# Patient Record
Sex: FEMALE | Race: WHITE | NOT HISPANIC OR LATINO | Employment: OTHER | URBAN - METROPOLITAN AREA
[De-identification: names, ages, dates, MRNs, and addresses within clinical notes are randomized per-mention and may not be internally consistent; named-entity substitution may affect disease eponyms.]

---

## 2018-09-06 ENCOUNTER — TELEPHONE (OUTPATIENT)
Dept: RHEUMATOLOGY | Facility: CLINIC | Age: 73
End: 2018-09-06

## 2018-09-06 NOTE — TELEPHONE ENCOUNTER
----- Message from Elroy Hopkins sent at 9/6/2018 12:18 PM CDT -----  Type:  Sooner Apoointment Request    Caller is requesting a sooner appointment.  Caller declined first available appointment listed below.  Caller will not accept being placed on the waitlist and is requesting a message be sent to doctor.    Name of Caller:  Patient  When is the first available appointment: na  Best Call Back Number:  [phone]  Additional Information:  New Patient has possible Rheumatoid Arthritis     Spoke to the patient and booked for next March. Will get a referral from PCP and link to appointment. Letter mailed and patient is on the wait list. IRIS

## 2018-11-13 PROBLEM — M16.12 OSTEOARTHRITIS OF LEFT HIP: Status: ACTIVE | Noted: 2018-11-13

## 2019-03-20 ENCOUNTER — HOSPITAL ENCOUNTER (OUTPATIENT)
Dept: RADIOLOGY | Facility: HOSPITAL | Age: 74
Discharge: HOME | End: 2019-03-20
Attending: INTERNAL MEDICINE
Payer: MEDICARE

## 2019-03-20 ENCOUNTER — INITIAL CONSULT (OUTPATIENT)
Dept: RHEUMATOLOGY | Facility: CLINIC | Age: 74
End: 2019-03-20
Payer: MEDICARE

## 2019-03-20 VITALS
HEART RATE: 67 BPM | WEIGHT: 180 LBS | BODY MASS INDEX: 33.13 KG/M2 | DIASTOLIC BLOOD PRESSURE: 83 MMHG | SYSTOLIC BLOOD PRESSURE: 138 MMHG | HEIGHT: 62 IN

## 2019-03-20 DIAGNOSIS — M15.4 EROSIVE OSTEOARTHRITIS OF BOTH HANDS: ICD-10-CM

## 2019-03-20 DIAGNOSIS — M15.4 EROSIVE OSTEOARTHRITIS OF BOTH HANDS: Primary | ICD-10-CM

## 2019-03-20 PROCEDURE — 99999 PR PBB SHADOW E&M-EST. PATIENT-LVL III: CPT | Mod: PBBFAC,,, | Performed by: INTERNAL MEDICINE

## 2019-03-20 PROCEDURE — 3079F DIAST BP 80-89 MM HG: CPT | Mod: CPTII,S$GLB,, | Performed by: INTERNAL MEDICINE

## 2019-03-20 PROCEDURE — 73130 XR HAND COMPLETE 3 VIEWS BILATERAL: ICD-10-PCS | Mod: 26,50,, | Performed by: RADIOLOGY

## 2019-03-20 PROCEDURE — 73130 X-RAY EXAM OF HAND: CPT | Mod: 50,TC,FY,PO

## 2019-03-20 PROCEDURE — 1101F PT FALLS ASSESS-DOCD LE1/YR: CPT | Mod: CPTII,S$GLB,, | Performed by: INTERNAL MEDICINE

## 2019-03-20 PROCEDURE — 73130 X-RAY EXAM OF HAND: CPT | Mod: 26,50,, | Performed by: RADIOLOGY

## 2019-03-20 PROCEDURE — 3075F SYST BP GE 130 - 139MM HG: CPT | Mod: CPTII,S$GLB,, | Performed by: INTERNAL MEDICINE

## 2019-03-20 PROCEDURE — 99205 PR OFFICE/OUTPT VISIT, NEW, LEVL V, 60-74 MIN: ICD-10-PCS | Mod: S$GLB,,, | Performed by: INTERNAL MEDICINE

## 2019-03-20 PROCEDURE — 99205 OFFICE O/P NEW HI 60 MIN: CPT | Mod: S$GLB,,, | Performed by: INTERNAL MEDICINE

## 2019-03-20 PROCEDURE — 1101F PR PT FALLS ASSESS DOC 0-1 FALLS W/OUT INJ PAST YR: ICD-10-PCS | Mod: CPTII,S$GLB,, | Performed by: INTERNAL MEDICINE

## 2019-03-20 PROCEDURE — 3079F PR MOST RECENT DIASTOLIC BLOOD PRESSURE 80-89 MM HG: ICD-10-PCS | Mod: CPTII,S$GLB,, | Performed by: INTERNAL MEDICINE

## 2019-03-20 PROCEDURE — 3075F PR MOST RECENT SYSTOLIC BLOOD PRESS GE 130-139MM HG: ICD-10-PCS | Mod: CPTII,S$GLB,, | Performed by: INTERNAL MEDICINE

## 2019-03-20 PROCEDURE — 99999 PR PBB SHADOW E&M-EST. PATIENT-LVL III: ICD-10-PCS | Mod: PBBFAC,,, | Performed by: INTERNAL MEDICINE

## 2019-03-20 NOTE — PATIENT INSTRUCTIONS
1. Omega 3 fatty acids: fish oils double the normal dose.     2. Steven Naturally Curamin or CuraMed     3. Triple products with glucosamine/Chondroitin sulfate, tumeric/curcumin, and fish oils.

## 2019-03-20 NOTE — PROGRESS NOTES
"Subjective:          Chief Complaint: uJlia Ward is a 74 y.o. female who had no chief complaint listed for this encounter.    HPI:    Patient is a 74-year-old female referred for from her primary care physician regarding long history of osteoarthritis she was referred with c/o hands arthritis , but also in 9/2018 has acute onset left hip pain ultimately had BETZAIDA.   Due to pain.   She does use PRN Ibuprofen 600-800mg only as needed, no hx of MI/CAD but limits to watch kidney function.   Tylenol no real help.         Left knee and bilateral hip arthroplasty most recent being the left hip 2018.  Patient states that her left hand does hurt at night describes throbbing and burtning. Trouble with fine motor/. First noticed bony hypertrophy in 50s.   Notes when inflammed become red and swollen then new "bump" with arrive.   Right knee is painful.   No problems with feet. Some neck pain with restriction ROM.     + family hx of OA, no known RA, father with hands like mine.       REVIEW OF SYSTEMS:    ROS            Objective:            Past Medical History:   Diagnosis Date    Anxiety     Encounter for blood transfusion     GERD (gastroesophageal reflux disease)     Hypertension      Family History   Problem Relation Age of Onset    Alzheimer's disease Mother     Cancer Maternal Aunt         breast    Alzheimer's disease Maternal Aunt     Breast cancer Maternal Aunt     Heart disease Neg Hx     Diabetes Neg Hx      Social History     Tobacco Use    Smoking status: Never Smoker    Smokeless tobacco: Never Used   Substance Use Topics    Alcohol use: No    Drug use: No         Current Outpatient Medications on File Prior to Visit   Medication Sig Dispense Refill    amLODIPine (NORVASC) 2.5 MG tablet Take 1 tablet (2.5 mg total) by mouth once daily. 90 tablet 3    ascorbic acid (VITAMIN C) 500 MG tablet Take 500 mg by mouth once daily.      b complex vitamins tablet Take 1 tablet by mouth once daily.   "    calcium carbonate (TUMS) 200 mg calcium (500 mg) chewable tablet Take 2 tablets by mouth as needed.       citalopram (CELEXA) 20 MG tablet Take 1 tablet (20 mg total) by mouth once daily. 90 tablet 3    co-enzyme Q-10 30 mg capsule Take 100 mg by mouth once daily.      cyanocobalamin (VITAMIN B-12) 1000 MCG tablet Take 100 mcg by mouth once daily.      folic acid (FOLVITE) 400 MCG tablet Take 400 mcg by mouth once daily.      metoprolol succinate (TOPROL-XL) 100 MG 24 hr tablet Take 1 tablet (100 mg total) by mouth once daily. 90 tablet 3    multivitamin (ONE DAILY MULTIVITAMIN) per tablet Take 1 tablet by mouth once daily.      vitamin D 1000 units Tab Take 1,000 mg by mouth.       [DISCONTINUED] estradiol 0.05 mg/24 hr td ptwk 0.05 mg/24 hr PTWK        No current facility-administered medications on file prior to visit.        Vitals:    03/20/19 0911   BP: 138/83   Pulse: 67       Physical Exam:    Physical Exam          Assessment:       Encounter Diagnosis   Name Primary?    Erosive osteoarthritis of both hands Yes          Plan:        Erosive osteoarthritis of both hands  -     Sedimentation rate; Future; Expected date: 03/20/2019  -     C-reactive protein; Standing; Expected date: 03/20/2019  -     Rheumatoid factor; Future; Expected date: 03/20/2019  -     Cyclic citrul peptide antibody, IgG; Future; Expected date: 03/20/2019  -     X-Ray Hand 3 View Bilateral; Future; Expected date: 03/20/2019      Delightful 75 y/o female.    Trial with OTC complementary medicine.     Follow-up in about 4 months (around 7/20/2019).

## 2019-03-20 NOTE — LETTER
March 20, 2019      Isha Phillips MD  [address] [place] [address] [address] [place] [address]           [facility]  [address] [facility] [address] [place] [address]  Phone: [phone]  Fax: [phone]          Patient: Julia Ward   MR Number: [id number]   YOB: 1945   Date of Visit: 3/20/2019       Dear Dr. Isha Phillips:    Thank you for referring Julia Ward to me for evaluation. Attached you will find relevant portions of my assessment and plan of care.    If you have questions, please do not hesitate to call me. I look forward to following Julia Ward along with you.    Sincerely,    Tahira Montgomery, DO    Enclosure  CC:  No Recipients    If you would like to receive this communication electronically, please contact [email or web address] or [phone] to request more information on [facility] access.    For providers and/or their staff who would like to refer a patient to [facility], please contact us through our one-stop-shop provider referral line, [facility] , at [phone].    If you feel you have received this communication in error or would no longer like to receive these types of communications, please e-mail [email or web address]

## 2020-02-10 PROBLEM — M17.11 PRIMARY OSTEOARTHRITIS OF RIGHT KNEE: Status: ACTIVE | Noted: 2020-02-10

## 2021-01-20 ENCOUNTER — OFFICE VISIT (OUTPATIENT)
Dept: PULMONOLOGY | Facility: CLINIC | Age: 76
End: 2021-01-20
Payer: MEDICARE

## 2021-01-20 VITALS
BODY MASS INDEX: 34.32 KG/M2 | HEART RATE: 63 BPM | WEIGHT: 186.5 LBS | DIASTOLIC BLOOD PRESSURE: 74 MMHG | OXYGEN SATURATION: 95 % | SYSTOLIC BLOOD PRESSURE: 155 MMHG | HEIGHT: 62 IN

## 2021-01-20 DIAGNOSIS — R05.3 CHRONIC COUGH: ICD-10-CM

## 2021-01-20 DIAGNOSIS — G89.29 CHRONIC BILATERAL LOW BACK PAIN WITHOUT SCIATICA: ICD-10-CM

## 2021-01-20 DIAGNOSIS — M54.50 CHRONIC BILATERAL LOW BACK PAIN WITHOUT SCIATICA: ICD-10-CM

## 2021-01-20 DIAGNOSIS — R06.2 WHEEZES: Primary | ICD-10-CM

## 2021-01-20 DIAGNOSIS — R06.09 DOE (DYSPNEA ON EXERTION): ICD-10-CM

## 2021-01-20 PROCEDURE — 3077F PR MOST RECENT SYSTOLIC BLOOD PRESSURE >= 140 MM HG: ICD-10-PCS | Mod: CPTII,S$GLB,, | Performed by: INTERNAL MEDICINE

## 2021-01-20 PROCEDURE — 1126F AMNT PAIN NOTED NONE PRSNT: CPT | Mod: S$GLB,,, | Performed by: INTERNAL MEDICINE

## 2021-01-20 PROCEDURE — 99999 PR PBB SHADOW E&M-EST. PATIENT-LVL V: CPT | Mod: PBBFAC,,, | Performed by: INTERNAL MEDICINE

## 2021-01-20 PROCEDURE — 99205 PR OFFICE/OUTPT VISIT, NEW, LEVL V, 60-74 MIN: ICD-10-PCS | Mod: S$GLB,,, | Performed by: INTERNAL MEDICINE

## 2021-01-20 PROCEDURE — 3078F DIAST BP <80 MM HG: CPT | Mod: CPTII,S$GLB,, | Performed by: INTERNAL MEDICINE

## 2021-01-20 PROCEDURE — 3077F SYST BP >= 140 MM HG: CPT | Mod: CPTII,S$GLB,, | Performed by: INTERNAL MEDICINE

## 2021-01-20 PROCEDURE — 3288F PR FALLS RISK ASSESSMENT DOCUMENTED: ICD-10-PCS | Mod: CPTII,S$GLB,, | Performed by: INTERNAL MEDICINE

## 2021-01-20 PROCEDURE — 99205 OFFICE O/P NEW HI 60 MIN: CPT | Mod: S$GLB,,, | Performed by: INTERNAL MEDICINE

## 2021-01-20 PROCEDURE — 1126F PR PAIN SEVERITY QUANTIFIED, NO PAIN PRESENT: ICD-10-PCS | Mod: S$GLB,,, | Performed by: INTERNAL MEDICINE

## 2021-01-20 PROCEDURE — 3078F PR MOST RECENT DIASTOLIC BLOOD PRESSURE < 80 MM HG: ICD-10-PCS | Mod: CPTII,S$GLB,, | Performed by: INTERNAL MEDICINE

## 2021-01-20 PROCEDURE — 1101F PR PT FALLS ASSESS DOC 0-1 FALLS W/OUT INJ PAST YR: ICD-10-PCS | Mod: CPTII,S$GLB,, | Performed by: INTERNAL MEDICINE

## 2021-01-20 PROCEDURE — 1159F MED LIST DOCD IN RCRD: CPT | Mod: S$GLB,,, | Performed by: INTERNAL MEDICINE

## 2021-01-20 PROCEDURE — 3288F FALL RISK ASSESSMENT DOCD: CPT | Mod: CPTII,S$GLB,, | Performed by: INTERNAL MEDICINE

## 2021-01-20 PROCEDURE — 1101F PT FALLS ASSESS-DOCD LE1/YR: CPT | Mod: CPTII,S$GLB,, | Performed by: INTERNAL MEDICINE

## 2021-01-20 PROCEDURE — 99999 PR PBB SHADOW E&M-EST. PATIENT-LVL V: ICD-10-PCS | Mod: PBBFAC,,, | Performed by: INTERNAL MEDICINE

## 2021-01-20 PROCEDURE — 1159F PR MEDICATION LIST DOCUMENTED IN MEDICAL RECORD: ICD-10-PCS | Mod: S$GLB,,, | Performed by: INTERNAL MEDICINE

## 2021-01-20 RX ORDER — TRIAMCINOLONE ACETONIDE 1 MG/G
CREAM TOPICAL
COMMUNITY
Start: 2020-10-13 | End: 2022-05-04

## 2021-01-20 RX ORDER — FLUTICASONE FUROATE, UMECLIDINIUM BROMIDE AND VILANTEROL TRIFENATATE 200; 62.5; 25 UG/1; UG/1; UG/1
1 POWDER RESPIRATORY (INHALATION) DAILY
Qty: 60 EACH | Refills: 11 | Status: SHIPPED | OUTPATIENT
Start: 2021-01-20 | End: 2021-05-31

## 2021-01-20 RX ORDER — AMOXICILLIN 875 MG/1
TABLET, FILM COATED ORAL
COMMUNITY
Start: 2020-12-10 | End: 2021-05-31

## 2021-01-20 RX ORDER — CELECOXIB 100 MG/1
200 CAPSULE ORAL DAILY PRN
Qty: 60 CAPSULE | Refills: 2 | Status: SHIPPED | OUTPATIENT
Start: 2021-01-20 | End: 2022-01-25

## 2021-01-20 RX ORDER — ALBUTEROL SULFATE 90 UG/1
AEROSOL, METERED RESPIRATORY (INHALATION)
Qty: 18 G | Refills: 5 | Status: SHIPPED | OUTPATIENT
Start: 2021-01-20 | End: 2022-01-25

## 2021-01-20 RX ORDER — INFLUENZA A VIRUS A/MICHIGAN/45/2015 X-275 (H1N1) ANTIGEN (FORMALDEHYDE INACTIVATED), INFLUENZA A VIRUS A/SINGAPORE/INFIMH-16-0019/2016 IVR-186 (H3N2) ANTIGEN (FORMALDEHYDE INACTIVATED), INFLUENZA B VIRUS B/PHUKET/3073/2013 ANTIGEN (FORMALDEHYDE INACTIVATED), AND INFLUENZA B VIRUS B/MARYLAND/15/2016 BX-69A ANTIGEN (FORMALDEHYDE INACTIVATED) 60; 60; 60; 60 UG/.7ML; UG/.7ML; UG/.7ML; UG/.7ML
INJECTION, SUSPENSION INTRAMUSCULAR
COMMUNITY
Start: 2020-11-02 | End: 2021-05-31

## 2021-01-21 ENCOUNTER — TELEPHONE (OUTPATIENT)
Dept: PULMONOLOGY | Facility: CLINIC | Age: 76
End: 2021-01-21

## 2021-01-22 ENCOUNTER — TELEPHONE (OUTPATIENT)
Dept: PULMONOLOGY | Facility: CLINIC | Age: 76
End: 2021-01-22

## 2022-03-21 ENCOUNTER — PATIENT MESSAGE (OUTPATIENT)
Dept: OTOLARYNGOLOGY | Facility: CLINIC | Age: 77
End: 2022-03-21
Payer: MEDICARE

## 2022-03-30 DIAGNOSIS — H91.90 HEARING DIFFICULTY, UNSPECIFIED LATERALITY: Primary | ICD-10-CM

## 2022-05-04 ENCOUNTER — OFFICE VISIT (OUTPATIENT)
Dept: OTOLARYNGOLOGY | Facility: CLINIC | Age: 77
End: 2022-05-04
Payer: MEDICARE

## 2022-05-04 VITALS — WEIGHT: 188.94 LBS | BODY MASS INDEX: 34.56 KG/M2

## 2022-05-04 DIAGNOSIS — R09.A2 GLOBUS SENSATION: Primary | ICD-10-CM

## 2022-05-04 DIAGNOSIS — H92.01 OTALGIA, RIGHT: ICD-10-CM

## 2022-05-04 PROCEDURE — 99999 PR PBB SHADOW E&M-EST. PATIENT-LVL III: ICD-10-PCS | Mod: PBBFAC,,, | Performed by: OTOLARYNGOLOGY

## 2022-05-04 PROCEDURE — 99203 PR OFFICE/OUTPT VISIT, NEW, LEVL III, 30-44 MIN: ICD-10-PCS | Mod: S$GLB,,, | Performed by: OTOLARYNGOLOGY

## 2022-05-04 PROCEDURE — 99999 PR PBB SHADOW E&M-EST. PATIENT-LVL III: CPT | Mod: PBBFAC,,, | Performed by: OTOLARYNGOLOGY

## 2022-05-04 PROCEDURE — 99203 OFFICE O/P NEW LOW 30 MIN: CPT | Mod: S$GLB,,, | Performed by: OTOLARYNGOLOGY

## 2022-05-04 RX ORDER — METOPROLOL SUCCINATE 25 MG/1
25 TABLET, EXTENDED RELEASE ORAL DAILY
COMMUNITY
Start: 2022-03-12 | End: 2022-05-04

## 2022-05-04 NOTE — PROGRESS NOTES
Subjective:       Patient ID: Julia Ward is a 77 y.o. female.    Chief Complaint: Otalgia (Right ear pain / pain all around R ear)    Julia is here for right ear pain.  Ear pain has been going on for years. Fluctuates intensity at time. Pain is retroauricular and infra-auricular.   No ear surgeries.     She also c/o dry cough. Present for 8 months. Has improved over time. Just feels mild sore throat and mucous sensation. Drinks 16 oz water per day. Drinks a few ice coffees per week. She does take Celexa 20 mg nightly.    She has long history of neck and cervical spine issues.    Patient validated questionnaires (if applicable):      %       No flowsheet data found.  No flowsheet data found.  No flowsheet data found.         Social History     Tobacco Use   Smoking Status Never Smoker   Smokeless Tobacco Never Used     Social History     Substance and Sexual Activity   Alcohol Use No          Objective:        Constitutional:   She is oriented to person, place, and time. She appears well-developed and well-nourished. She appears alert. She is active. Normal speech.      Head:  Normocephalic and atraumatic. Head is without TMJ tenderness. No scars. Salivary glands normal.  Facial strength is normal.      Ears:    Right Ear: No drainage or swelling. No middle ear effusion.   Left Ear: No drainage or swelling.  No middle ear effusion.     Nose:  No mucosal edema, rhinorrhea or sinus tenderness. No turbinate hypertrophy.      Mouth/Throat  Oropharynx clear and moist without lesions or asymmetry, normal uvula midline and mirror exam normal. Normal dentition. No uvula swelling, lacerations or trismus. No oropharyngeal exudate. Tonsillar erythema, tonsillar exudate.      Neck:  Full range of motion with neck supple and no adenopathy. Thyroid tenderness is present. No tracheal deviation, no edema, no erythema, normal range of motion, no stridor, no crepitus and no neck rigidity present. No thyroid mass present.      Cardiovascular:   Intact distal pulses and normal pulses.      Pulmonary/Chest:   Effort normal and breath sounds normal. No stridor.     Psychiatric:   Her speech is normal and behavior is normal. Her mood appears not anxious. Her affect is not labile.     Neurological:   She is alert and oriented to person, place, and time. No sensory deficit.     Skin:   No abrasions, lacerations, lesions, or rashes. No abrasion and no bruising noted.         Tests / Results:  None    Assessment:       1. Globus sensation    2. Otalgia, right          Plan:         We discussed MSK / cervical spine cause to otalgia.   Ear exam wnl. Reassurance provided    Discussed mucous management

## 2022-05-04 NOTE — PATIENT INSTRUCTIONS
"Mucous (Post nasal drip) Management    A fullness sensation in the back of the throat is called "globus."   Many people attribute this fullness to a sensation of increased mucous, because they can feel a sticky material in the throat that they will occasionally cough up.    Nasal  / Throat Mucous  Our body NORMALLY makes 1 liter or more of saliva (spit) and nasal mucous every day. These body fluids are important for breaking down the food we eat, protecting our teeth from cavities, and clearing out the pollen and irritants that we breathe in our nose. As our body makes these fluids, we swallow them throughout the day, where they are recycled back through the body. This process is happening all our life without us thinking about it. When an adjustment to this process causes the mucous to be increased or thicker, is when we notice it.     Some problems cause an INCREASE in these body fluids, which we perceive as irritating, excess phlegm in the throat.   However, it is not always an increase. Many times there is a change in CONSISTENCY of the mucous to make it thicker. This will cause the mucous to be held up in the throat instead of being swallowed easily.    Several factors can cause these problems:  Dryness of throat and nose which increases the mucous sticking - Causes include inadequate hydration, excess caffeine / soda / sugary drinks, medication side effects.  Acid reflux  Increased mucous production from allergies or chronic sinus drainage.     We will evaluate the cause(s) of increased or thickened mucous and consider different treatment strategies:    MANY COMMON medications cause dryness of the throat, including medications for allergy, depression/anxiety, heart, and urination issues.   There is some evidence that added sugars or processed sugars in the diet (not the kind that occur naturally in honey or ripe fruit) can increase mucus, as well as too much dairy. To avoid these refined carbohydrates, on food " labels, watch out for wheat flour (also called white, refined or enriched flour) on the ingredients list.     Recommendations for Increased Mucous Sensation    Water, water, water - this cannot be understated. Most people are not drinking enough water regularly to keep up with body's demand. Recommend AT LEAST 64 oz of water per day, and more for men (up to 100 oz.) unless a medical issue prevents this.  Reduce intake of caffeine / tea / sugary drinks or soda, which all will cause increased mucous.  If your nose is the source of mucous, nasal medications discussed by your doctor as well as nasal saline can be effective to wash away the mucous.  Prevention of acid reflux by avoiding late-night eating (nothing 3 hours before laying down at night), greasy and spicy foods, alcohol, and acidic foods  Humidifier in the bedroom if you find dryness increases at night.  Smoking cessation if you use tobacco  Examination your medication list with your doctor to determine medications that may be contributing    There are NUMEROUS over the counter mucous thinning agents. Here are some suggestions that can be purchased online:  Vargas's Breezer's (Sugar Free), Kelli's black currant pastilles, and Entertainer's Secret Throat Relief can all help dry mouth and thickened mucous.   Biotene spray and mouthwash can help lubricate a dry mouth  Mucinex can be helpful in some cases, but stop taking if you don't notice improvement after a few weeks.

## 2022-08-01 ENCOUNTER — OFFICE VISIT (OUTPATIENT)
Dept: GASTROENTEROLOGY | Facility: CLINIC | Age: 77
End: 2022-08-01
Payer: MEDICARE

## 2022-08-01 VITALS — HEIGHT: 62 IN | BODY MASS INDEX: 34.56 KG/M2 | WEIGHT: 187.81 LBS

## 2022-08-01 DIAGNOSIS — R09.A2 GLOBUS SENSATION: ICD-10-CM

## 2022-08-01 DIAGNOSIS — R05.9 COUGH: Primary | ICD-10-CM

## 2022-08-01 DIAGNOSIS — Z87.19 HISTORY OF GASTROESOPHAGEAL REFLUX (GERD): ICD-10-CM

## 2022-08-01 DIAGNOSIS — J02.9 SORE THROAT: ICD-10-CM

## 2022-08-01 PROCEDURE — 99214 PR OFFICE/OUTPT VISIT, EST, LEVL IV, 30-39 MIN: ICD-10-PCS | Mod: S$GLB,,, | Performed by: NURSE PRACTITIONER

## 2022-08-01 PROCEDURE — 1159F MED LIST DOCD IN RCRD: CPT | Mod: CPTII,S$GLB,, | Performed by: NURSE PRACTITIONER

## 2022-08-01 PROCEDURE — 1126F AMNT PAIN NOTED NONE PRSNT: CPT | Mod: CPTII,S$GLB,, | Performed by: NURSE PRACTITIONER

## 2022-08-01 PROCEDURE — 99214 OFFICE O/P EST MOD 30 MIN: CPT | Mod: S$GLB,,, | Performed by: NURSE PRACTITIONER

## 2022-08-01 PROCEDURE — 1160F RVW MEDS BY RX/DR IN RCRD: CPT | Mod: CPTII,S$GLB,, | Performed by: NURSE PRACTITIONER

## 2022-08-01 PROCEDURE — 1160F PR REVIEW ALL MEDS BY PRESCRIBER/CLIN PHARMACIST DOCUMENTED: ICD-10-PCS | Mod: CPTII,S$GLB,, | Performed by: NURSE PRACTITIONER

## 2022-08-01 PROCEDURE — 1101F PR PT FALLS ASSESS DOC 0-1 FALLS W/OUT INJ PAST YR: ICD-10-PCS | Mod: CPTII,S$GLB,, | Performed by: NURSE PRACTITIONER

## 2022-08-01 PROCEDURE — 1159F PR MEDICATION LIST DOCUMENTED IN MEDICAL RECORD: ICD-10-PCS | Mod: CPTII,S$GLB,, | Performed by: NURSE PRACTITIONER

## 2022-08-01 PROCEDURE — 99999 PR PBB SHADOW E&M-EST. PATIENT-LVL IV: CPT | Mod: PBBFAC,,, | Performed by: NURSE PRACTITIONER

## 2022-08-01 PROCEDURE — 3288F PR FALLS RISK ASSESSMENT DOCUMENTED: ICD-10-PCS | Mod: CPTII,S$GLB,, | Performed by: NURSE PRACTITIONER

## 2022-08-01 PROCEDURE — 1101F PT FALLS ASSESS-DOCD LE1/YR: CPT | Mod: CPTII,S$GLB,, | Performed by: NURSE PRACTITIONER

## 2022-08-01 PROCEDURE — 99999 PR PBB SHADOW E&M-EST. PATIENT-LVL IV: ICD-10-PCS | Mod: PBBFAC,,, | Performed by: NURSE PRACTITIONER

## 2022-08-01 PROCEDURE — 1126F PR PAIN SEVERITY QUANTIFIED, NO PAIN PRESENT: ICD-10-PCS | Mod: CPTII,S$GLB,, | Performed by: NURSE PRACTITIONER

## 2022-08-01 PROCEDURE — 3288F FALL RISK ASSESSMENT DOCD: CPT | Mod: CPTII,S$GLB,, | Performed by: NURSE PRACTITIONER

## 2022-08-01 RX ORDER — OMEPRAZOLE 40 MG/1
40 CAPSULE, DELAYED RELEASE ORAL
Qty: 60 CAPSULE | Refills: 1 | Status: SHIPPED | OUTPATIENT
Start: 2022-08-01 | End: 2023-02-17

## 2022-08-01 NOTE — PROGRESS NOTES
Subjective:       Patient ID: Julia Ward is a 77 y.o. female Body mass index is 34.35 kg/m².    Chief Complaint: cough and Sore Throat    This patient is new to me.     Gastroesophageal Reflux  She complains of coughing (CHIEF COMPLAINT: started over a year ago, starts with a tickle in her throat which is relieved with coughing, occurs 0-4 times a day, triggered at times when she first lies down at night, coughs then sleeps fine thereafter; seen 4 pulmonologists & 2 ENTs), globus sensation (lump in throat sensation), heartburn (occurs 1-2 times a month) and a sore throat. She reports no abdominal pain, no chest pain, no dysphagia or no nausea. The heartburn wakes her from sleep. Risk factors include obesity. She has tried a histamine-2 antagonist and a PPI (PAST: pepcid, protonix daily x 1 month- no relief so she stopped) for the symptoms. PFTs, saw cardiologist (stress tests).     Review of Systems   Constitutional: Negative for appetite change and fever.   HENT: Positive for congestion (in her sinuses and nose) and sore throat. Negative for trouble swallowing.    Respiratory: Positive for cough (CHIEF COMPLAINT: started over a year ago, starts with a tickle in her throat which is relieved with coughing, occurs 0-4 times a day, triggered at times when she first lies down at night, coughs then sleeps fine thereafter; seen 4 pulmonologists & 2 ENTs) and shortness of breath (chronic history of this with exertion/activity; denies currently).         PAST TREATMENT: tessalon, stopping beta-blocker, trelegy and albuterol- no relief; protonix 40 mg BID x 1 month- no relief   Cardiovascular: Negative for chest pain.   Gastrointestinal: Positive for heartburn (occurs 1-2 times a month). Negative for abdominal pain, anal bleeding, blood in stool, constipation, diarrhea, dysphagia, nausea, rectal pain and vomiting.   Neurological: Positive for dizziness (occasional, occurs with right ear pain; reports she relates to  right ear pain that started after trauma over 20 years ago).       No LMP recorded. Patient has had a hysterectomy.  Past Medical History:   Diagnosis Date    Anxiety     C. difficile diarrhea ~2012    Encounter for blood transfusion     GERD (gastroesophageal reflux disease)     Hypertension      Past Surgical History:   Procedure Laterality Date    BACK SURGERY  8/2013    rods in back    BREAST BIOPSY Left 07/26/2013    us needle guided    CARDIAC CATHETERIZATION      COLONOSCOPY  [id number]    normal findings per patient report    HIP ARTHROPLASTY Left 11/13/2018    Procedure: ARTHROPLASTY, HIP;  Surgeon: Jalen Villanueva MD;  Location: [facility] OR;  Service: Orthopedics;  Laterality: Left;    HYSTERECTOMY  1988    partial    JOINT REPLACEMENT Left 8/2008    TKA    JOINT REPLACEMENT Left 11/13/2018    BETZAIDA    JOINT REPLACEMENT Right 2009    BETZAIDA    TONSILLECTOMY      childhood    TOTAL ABDOMINAL HYSTERECTOMY W/ BILATERAL SALPINGOOPHORECTOMY Bilateral 9/2013    TOTAL HIP ARTHROPLASTY Right 9/2009    TOTAL KNEE ARTHROPLASTY Left      Family History   Problem Relation Age of Onset    Alzheimer's disease Mother     Cancer Maternal Aunt         breast    Alzheimer's disease Maternal Aunt     Breast cancer Maternal Aunt     Breast cancer Sister 68        diagnosed 2021    Heart disease Neg Hx     Diabetes Neg Hx     Colon cancer Neg Hx     Crohn's disease Neg Hx     Esophageal cancer Neg Hx     Stomach cancer Neg Hx     Ulcerative colitis Neg Hx      Social History     Tobacco Use    Smoking status: Never Smoker    Smokeless tobacco: Never Used   Substance Use Topics    Alcohol use: No    Drug use: No     Wt Readings from Last 10 Encounters:   08/01/22 85.2 kg (187 lb 13.3 oz)   07/25/22 85.3 kg (188 lb)   05/04/22 85.7 kg (188 lb 15 oz)   02/24/22 86.2 kg (190 lb)   01/25/22 87.5 kg (193 lb)   09/24/21 85.7 kg (189 lb)   07/22/21 85.8 kg (189 lb 3.2 oz)   05/31/21 84.4 kg (186 lb)   01/20/21  84.6 kg (186 lb 8.2 oz)   07/14/20 83.9 kg (185 lb)     Lab Results   Component Value Date    WBC 6.21 07/27/2022    HGB 14.0 07/27/2022    HCT 40.1 07/27/2022    MCV 95 07/27/2022     07/27/2022     CMP  Sodium   Date Value Ref Range Status   07/27/2022 139 136 - 145 mmol/L Final     Potassium   Date Value Ref Range Status   07/27/2022 4.2 3.5 - 5.1 mmol/L Final     Chloride   Date Value Ref Range Status   07/27/2022 104 95 - 110 mmol/L Final     CO2   Date Value Ref Range Status   07/27/2022 30 22 - 31 mmol/L Final     Glucose   Date Value Ref Range Status   07/27/2022 113 (H) 70 - 110 mg/dL Final     Comment:     The ADA recommends the following guidelines for fasting glucose:    Normal:       less than 100 mg/dL    Prediabetes:  100 mg/dL to 125 mg/dL    Diabetes:     126 mg/dL or higher       BUN   Date Value Ref Range Status   07/27/2022 18 7 - 18 mg/dL Final     Creatinine   Date Value Ref Range Status   07/27/2022 0.97 0.50 - 1.40 mg/dL Final     Calcium   Date Value Ref Range Status   07/27/2022 9.5 8.4 - 10.2 mg/dL Final     Total Protein   Date Value Ref Range Status   07/27/2022 6.7 6.0 - 8.4 g/dL Final     Albumin   Date Value Ref Range Status   07/27/2022 4.1 3.5 - 5.2 g/dL Final     Total Bilirubin   Date Value Ref Range Status   07/27/2022 0.6 0.2 - 1.3 mg/dL Final     Alkaline Phosphatase   Date Value Ref Range Status   07/27/2022 93 38 - 145 U/L Final     AST ([place])   Date Value Ref Range Status   03/22/2016 23 14 - 36 U/L Final     AST   Date Value Ref Range Status   07/27/2022 30 14 - 36 U/L Final     ALT   Date Value Ref Range Status   07/27/2022 20 0 - 35 U/L Final     Anion Gap   Date Value Ref Range Status   07/27/2022 5 (L) 8 - 16 mmol/L Final     eGFR if    Date Value Ref Range Status   07/27/2022 >60 >60 mL/min/1.73 m^2 Final     eGFR if non    Date Value Ref Range Status   07/27/2022 56 (A) >60 mL/min/1.73 m^2 Final     Comment:      Calculation used to obtain the estimated glomerular filtration  rate (eGFR) is the CKD-EPI equation.        Lab Results   Component Value Date    TSH 0.963 07/27/2022     Reviewed prior medical records including radiology report of 8/21/2017 CT chest; 7/25/2022, 2/24/2022, & 1/25/2022 visit notes with PCP Dr. Phillips; 5/4/2022 visit note with ENT Dr. Toledo; 1/20/2021 visit note with Dr. Cole; & endoscopy history (see surgical history/procedures).    Objective:      Physical Exam  Vitals and nursing note reviewed.   Constitutional:       General: She is not in acute distress.     Appearance: Normal appearance. She is well-developed. She is not diaphoretic.   HENT:      Mouth/Throat:      Comments: Patient is wearing a face mask, which covers patient's mouth and nose, due to COVID 19 concerns.  Eyes:      General: No scleral icterus.     Conjunctiva/sclera: Conjunctivae normal.   Pulmonary:      Effort: Pulmonary effort is normal. No respiratory distress.      Breath sounds: Normal breath sounds. No wheezing.   Abdominal:      General: Bowel sounds are normal. There is no distension or abdominal bruit.      Palpations: Abdomen is soft. Abdomen is not rigid. There is no mass.      Tenderness: There is no abdominal tenderness. There is no guarding or rebound. Negative signs include Cortés's sign and McBurney's sign.   Skin:     General: Skin is warm and dry.      Coloration: Skin is not pale.      Findings: No erythema or rash.      Comments: Non-jaundiced   Neurological:      Mental Status: She is alert and oriented to person, place, and time.   Psychiatric:         Behavior: Behavior normal.         Thought Content: Thought content normal.         Judgment: Judgment normal.         Assessment:       1. Cough    2. Sore throat    3. History of gastroesophageal reflux (GERD)    4. Globus sensation        Plan:       Cough & Globus sensation  -     FL Upper GI; Future; Expected date: 08/01/2022  - discussed about EGD  and EGD with Bravo testing; patient verbalized understanding and patient declined EGD with Bravo testing at this time and wants to proceed with just an EGD.  - schedule EGD, discussed procedure with patient, including risks and benefits, patient verbalized understanding  - Recommend follow-up with Primary Care Provider for continued evaluation and management.    Sore throat  - schedule EGD, discussed procedure with patient, including risks and benefits, patient verbalized understanding  - Recommend follow-up with ENT for continued evaluation and management.    History of gastroesophageal reflux (GERD)  -     FL Upper GI; Future; Expected date: 08/01/2022  -   START  omeprazole (PRILOSEC) 40 MG capsule; Take 1 capsule (40 mg total) by mouth 2 (two) times daily before meals.  Dispense: 60 capsule; Refill: 1  - schedule EGD, discussed procedure with patient, including risks and benefits, patient verbalized understanding    Follow up in about 1 month (around 9/1/2022), or if symptoms worsen or fail to improve.      If no improvement in symptoms or symptoms worsen, call/follow-up at clinic or go to ER.        42 minutes of total time spent on the encounter, which includes face to face time and non-face to face time preparing to see the patient (e.g., review of tests), Obtaining and/or reviewing separately obtained history, Documenting clinical information in the electronic or other health record, Independently interpreting results (not separately reported) and communicating results to the patient/family/caregiver, or Care coordination (not separately reported).

## 2022-08-01 NOTE — PATIENT INSTRUCTIONS
"GERD (Adult)    The esophagus is a tube that carries food from the mouth to the stomach. A valve at the lower end of the esophagus prevents stomach acid from flowing upward. When this valve doesn't work properly, stomach contents may repeatedly flow back up (reflux) into the esophagus. This is called gastroesophageal reflux disease (GERD). GERD can irritate the esophagus. It can cause problems with swallowing or breathing. In severe cases, GERD can cause recurrent pneumonia or other serious problems.  Symptoms of reflux include burning, pressure or sharp pain in the upper abdomen or mid to lower chest. The pain can spread to the neck, back, or shoulder. There may be belching, an acid taste in the back of the throat, chronic cough, or sore throat or hoarseness. GERD symptoms often occur during the day after a big meal. They can also occur at night when lying down.   Home care  Lifestyle changes can help reduce symptoms. If needed, medicines may be prescribed. Symptoms often improve with treatment, but if treatment is stopped, the symptoms often return after a few months. So most persons with GERD will need to continue treatment.  Lifestyle changes  Limit or avoid fatty, fried, and spicy foods, as well as coffee, chocolate, mint, and foods with high acid content such as tomatoes and citrus fruit and juices (orange, grapefruit, lemon).  Dont eat large meals, especially at night. Frequent, smaller meals are best. Do not lie down right after eating. And dont eat anything 3 hours before going to bed.  Avoid drinking alcohol and smoking. As much as possible, stay away from second hand smoke.  If you are overweight, losing weight will reduce symptoms.   Avoid wearing tight clothing around your stomach area.  If your symptoms occur during sleep, use a foam wedge to elevate your upper body (not just your head.) Or, place 4" blocks under the head of your bed.  Medicines  If needed, medicines can help relieve the symptoms of " GERD and prevent damage to the esophagus. Discuss a medicine plan with your healthcare provider. This may include one or more of the following medicines:  Antacids to help neutralize the normal acids in your stomach.  Acid blockers (H2 blockers) to decrease acid production.  Acid inhibitors (PPIs) to decrease acid production in a different way than the blockers. They may work better, but can take a little longer to take effect.  Take an antacid 30-60 minutes after eating and at bedtime, but not at the same time as an acid blocker.  Try not to take medicines such as ibuprofen and aspirin. If you are taking aspirin for your heart or other medical reasons, talk to your healthcare provider about stopping it.  Follow-up care  Follow up with your healthcare provider or as advised by our staff.  When to seek medical advice  Call your healthcare provider if any of the following occur:  Stomach pain gets worse or moves to the lower right abdomen (appendix area)  Chest pain appears or gets worse, or spreads to the back, neck, shoulder, or arm  Frequent vomiting (cant keep down liquids)  Blood in the stool or vomit (red or black in color)  Feeling weak or dizzy  Fever of 100.4ºF (38ºC) or higher, or as directed by your healthcare provider  Date Last Reviewed: 6/23/2015  © 5546-1944 [employer]. [address] [place] [address], [place] [address]. All rights reserved. This information is not intended as a substitute for professional medical care. Always follow your healthcare professional's instructions.

## 2022-08-03 ENCOUNTER — TELEPHONE (OUTPATIENT)
Dept: GASTROENTEROLOGY | Facility: CLINIC | Age: 77
End: 2022-08-03
Payer: MEDICARE

## 2022-08-03 NOTE — TELEPHONE ENCOUNTER
Please call to inform & review the results with the patient- radiology report of the UGI showed no active gastroesophageal reflux was appreciated on this test but there is minimal sliding-type hiatal hernia with slight GE junctional narrowing seen. Continue with EGD as scheduled.  Otherwise, unremarkable findings.    Continue with previous recommendations. If no improvement in symptoms or symptoms worsen, call/follow-up at clinic or go to ER.    Thanks,

## 2022-08-12 ENCOUNTER — TELEPHONE (OUTPATIENT)
Dept: GASTROENTEROLOGY | Facility: CLINIC | Age: 77
End: 2022-08-12
Payer: MEDICARE

## 2022-08-12 NOTE — TELEPHONE ENCOUNTER
Returned call to pt. Pt states that she 'cleared up the problem on her own with a neti pot'. Pt wishes to cancel EGD and states she will call back to reschedule if she changes her mind. EGD cancelled per pt request

## 2022-08-12 NOTE — TELEPHONE ENCOUNTER
----- Message from Rosalee Reynolds sent at 8/12/2022  2:00 PM CDT -----  Contact: self  Type: Sooner Appointment Request        Caller is requesting a sooner appointment. Caller declined first available appointment listed below. Caller will not accept being placed on the waitlist and is requesting a message be sent to doctor.        Name of Caller: Patient  When is the first available appointment? N/a  Best Call Back Number: [phone] (home)   Additional Information: Pt states she wants to cancel procedure with Dr. Garcia for 10/6/2022. Plz call pt to verify that is cancelled. Thanks.

## 2023-02-15 ENCOUNTER — HOSPITAL ENCOUNTER (OUTPATIENT)
Dept: RADIOLOGY | Facility: HOSPITAL | Age: 78
Discharge: HOME | End: 2023-02-15
Attending: NURSE PRACTITIONER
Payer: MEDICARE

## 2023-02-15 PROCEDURE — 71046 X-RAY EXAM CHEST 2 VIEWS: CPT | Mod: TC,FY,PO

## 2023-02-15 PROCEDURE — 71046 XR CHEST PA AND LATERAL: ICD-10-PCS | Mod: 26,,, | Performed by: RADIOLOGY

## 2023-02-15 PROCEDURE — 71046 X-RAY EXAM CHEST 2 VIEWS: CPT | Mod: 26,,, | Performed by: RADIOLOGY

## 2023-02-16 ENCOUNTER — TELEPHONE (OUTPATIENT)
Dept: GASTROENTEROLOGY | Facility: CLINIC | Age: 78
End: 2023-02-16
Payer: MEDICARE

## 2023-02-16 DIAGNOSIS — Z87.19 HISTORY OF GASTROESOPHAGEAL REFLUX (GERD): ICD-10-CM

## 2023-02-17 RX ORDER — OMEPRAZOLE 40 MG/1
CAPSULE, DELAYED RELEASE ORAL
Qty: 60 CAPSULE | Refills: 0 | Status: SHIPPED | OUTPATIENT
Start: 2023-02-17

## 2023-02-17 NOTE — TELEPHONE ENCOUNTER
Please inform the patient that I refilled the prescription for prilosec and patient is due for a follow-up visit for continued evaluation and management.  Thanks  SHRAVAN

## 2023-02-23 ENCOUNTER — TELEPHONE (OUTPATIENT)
Dept: DERMATOLOGY | Facility: CLINIC | Age: 78
End: 2023-02-23
Payer: MEDICARE

## 2023-02-23 NOTE — TELEPHONE ENCOUNTER
----- Message from [facility] sent at 2/23/2023  2:08 PM CST -----  .Type: Appointment Request     Caller is requesting appointment    Was A Appointment Solution Found When Scheduling? NONE     Best Call Back Number: [phone]    Additional Information: EST CARE/SKIN CHECK

## 2023-03-07 ENCOUNTER — TELEPHONE (OUTPATIENT)
Dept: CARDIOLOGY | Facility: CLINIC | Age: 78
End: 2023-03-07
Payer: MEDICARE

## 2023-03-07 NOTE — TELEPHONE ENCOUNTER
----- Message from Franci Blood sent at 3/7/2023 12:56 PM CST -----  Contact: pt  Type:  Needs Medical Advice    Who Called: pt  Would the patient rather a call back or a response via [facility]? call  Best Call Back Number: [phone]  Additional Information: States she previously seen provider and need a callback from the nurse to verify and clarify some things. Also has some questions. Please advise thank you

## 2023-03-10 ENCOUNTER — OFFICE VISIT (OUTPATIENT)
Dept: CARDIOLOGY | Facility: CLINIC | Age: 78
End: 2023-03-10
Payer: MEDICARE

## 2023-03-10 VITALS
SYSTOLIC BLOOD PRESSURE: 141 MMHG | HEIGHT: 62 IN | DIASTOLIC BLOOD PRESSURE: 88 MMHG | HEART RATE: 83 BPM | BODY MASS INDEX: 34.64 KG/M2 | WEIGHT: 188.25 LBS

## 2023-03-10 DIAGNOSIS — I51.7 RIGHT VENTRICULAR ENLARGEMENT: ICD-10-CM

## 2023-03-10 DIAGNOSIS — Z01.810 ENCOUNTER FOR PRE-OPERATIVE CARDIOVASCULAR CLEARANCE: ICD-10-CM

## 2023-03-10 DIAGNOSIS — R94.31 NONSPECIFIC ABNORMAL ELECTROCARDIOGRAM (ECG) (EKG): ICD-10-CM

## 2023-03-10 DIAGNOSIS — I51.89 DIASTOLIC DYSFUNCTION: ICD-10-CM

## 2023-03-10 DIAGNOSIS — I10 ESSENTIAL HYPERTENSION: Chronic | ICD-10-CM

## 2023-03-10 DIAGNOSIS — E66.9 OBESITY, CLASS I, BMI 30-34.9: Chronic | ICD-10-CM

## 2023-03-10 DIAGNOSIS — R06.02 SOB (SHORTNESS OF BREATH): Primary | Chronic | ICD-10-CM

## 2023-03-10 PROBLEM — E66.811 OBESITY, CLASS I, BMI 30-34.9: Status: ACTIVE | Noted: 2023-03-10

## 2023-03-10 PROCEDURE — 1126F PR PAIN SEVERITY QUANTIFIED, NO PAIN PRESENT: ICD-10-PCS | Mod: CPTII,S$GLB,, | Performed by: INTERNAL MEDICINE

## 2023-03-10 PROCEDURE — 1159F PR MEDICATION LIST DOCUMENTED IN MEDICAL RECORD: ICD-10-PCS | Mod: CPTII,S$GLB,, | Performed by: INTERNAL MEDICINE

## 2023-03-10 PROCEDURE — 3077F PR MOST RECENT SYSTOLIC BLOOD PRESSURE >= 140 MM HG: ICD-10-PCS | Mod: CPTII,S$GLB,, | Performed by: INTERNAL MEDICINE

## 2023-03-10 PROCEDURE — 1101F PR PT FALLS ASSESS DOC 0-1 FALLS W/OUT INJ PAST YR: ICD-10-PCS | Mod: CPTII,S$GLB,, | Performed by: INTERNAL MEDICINE

## 2023-03-10 PROCEDURE — 3288F PR FALLS RISK ASSESSMENT DOCUMENTED: ICD-10-PCS | Mod: CPTII,S$GLB,, | Performed by: INTERNAL MEDICINE

## 2023-03-10 PROCEDURE — 99999 PR PBB SHADOW E&M-EST. PATIENT-LVL III: CPT | Mod: PBBFAC,,, | Performed by: INTERNAL MEDICINE

## 2023-03-10 PROCEDURE — 99999 PR PBB SHADOW E&M-EST. PATIENT-LVL III: ICD-10-PCS | Mod: PBBFAC,,, | Performed by: INTERNAL MEDICINE

## 2023-03-10 PROCEDURE — 1159F MED LIST DOCD IN RCRD: CPT | Mod: CPTII,S$GLB,, | Performed by: INTERNAL MEDICINE

## 2023-03-10 PROCEDURE — 99204 OFFICE O/P NEW MOD 45 MIN: CPT | Mod: S$GLB,,, | Performed by: INTERNAL MEDICINE

## 2023-03-10 PROCEDURE — 1126F AMNT PAIN NOTED NONE PRSNT: CPT | Mod: CPTII,S$GLB,, | Performed by: INTERNAL MEDICINE

## 2023-03-10 PROCEDURE — 99204 PR OFFICE/OUTPT VISIT, NEW, LEVL IV, 45-59 MIN: ICD-10-PCS | Mod: S$GLB,,, | Performed by: INTERNAL MEDICINE

## 2023-03-10 PROCEDURE — 3079F DIAST BP 80-89 MM HG: CPT | Mod: CPTII,S$GLB,, | Performed by: INTERNAL MEDICINE

## 2023-03-10 PROCEDURE — 3288F FALL RISK ASSESSMENT DOCD: CPT | Mod: CPTII,S$GLB,, | Performed by: INTERNAL MEDICINE

## 2023-03-10 PROCEDURE — 3079F PR MOST RECENT DIASTOLIC BLOOD PRESSURE 80-89 MM HG: ICD-10-PCS | Mod: CPTII,S$GLB,, | Performed by: INTERNAL MEDICINE

## 2023-03-10 PROCEDURE — 3077F SYST BP >= 140 MM HG: CPT | Mod: CPTII,S$GLB,, | Performed by: INTERNAL MEDICINE

## 2023-03-10 PROCEDURE — 1101F PT FALLS ASSESS-DOCD LE1/YR: CPT | Mod: CPTII,S$GLB,, | Performed by: INTERNAL MEDICINE

## 2023-03-10 NOTE — PROGRESS NOTES
Subjective:    Patient ID:  Julia Ward is a 78 y.o. female who presents for Hypertension, Shortness of Breath, and PREOP CLEARANCE        HPI  NEW PATIENT EVALUATION, PREOP CLEARANCE, RECENT ECHO AT [facility] SHOWED NORMAL LV FUNCTION GRADE 1 DIASTOLIC DYSFUNCTION MILD TR MILDLY REDUCED RIGHT VENTRICULAR FUNCTION PA PRESSURE 31 MM OF MERCURY, NEED CLEARANCE FOR EYE SURGERY, BAGS UNDER EYES,SAW SARAH TAVAREZ, PULMONARY FOR WILKES, AND SHORTNESS OF BREATH  SEE ROS    Past Medical History:   Diagnosis Date    Anxiety     C. difficile diarrhea ~2012    Encounter for blood transfusion     GERD (gastroesophageal reflux disease)     Hypertension      Past Surgical History:   Procedure Laterality Date    BACK SURGERY  8/2013    rods in back    BREAST BIOPSY Left 07/26/2013    us needle guided    CARDIAC CATHETERIZATION      COLONOSCOPY  [id number]    normal findings per patient report    HIP ARTHROPLASTY Left 11/13/2018    Procedure: ARTHROPLASTY, HIP;  Surgeon: Jalen Villanueva MD;  Location: [facility];  Service: Orthopedics;  Laterality: Left;    HYSTERECTOMY  1988    partial    JOINT REPLACEMENT Left 8/2008    TKA    JOINT REPLACEMENT Left 11/13/2018    BETZAIDA    JOINT REPLACEMENT Right 2009    BETZAIDA    TONSILLECTOMY      childhood    TOTAL ABDOMINAL HYSTERECTOMY W/ BILATERAL SALPINGOOPHORECTOMY Bilateral 9/2013    TOTAL HIP ARTHROPLASTY Right 9/2009    TOTAL KNEE ARTHROPLASTY Left      Family History   Problem Relation Age of Onset    Alzheimer's disease Mother     Cancer Maternal Aunt         breast    Alzheimer's disease Maternal Aunt     Breast cancer Maternal Aunt     Breast cancer Sister 68        diagnosed 2021    Heart disease Neg Hx     Diabetes Neg Hx     Colon cancer Neg Hx     Crohn's disease Neg Hx     Esophageal cancer Neg Hx     Stomach cancer Neg Hx     Ulcerative colitis Neg Hx      Social History     Socioeconomic History    Marital status:    Occupational History    Occupation: Retired    Tobacco Use    Smoking status: Never    Smokeless tobacco: Never   Substance and Sexual Activity    Alcohol use: No    Drug use: No    Sexual activity: Yes     Social Determinants of Health     Financial Resource Strain: Low Risk     Difficulty of Paying Living Expenses: Not very hard   Food Insecurity: No Food Insecurity    Worried About Running Out of Food in the Last Year: Never true    Ran Out of Food in the Last Year: Never true   Transportation Needs: No Transportation Needs    Lack of Transportation (Medical): No    Lack of Transportation (Non-Medical): No   Physical Activity: Insufficiently Active    Days of Exercise per Week: 1 day    Minutes of Exercise per Session: 40 min   Stress: No Stress Concern Present    Feeling of Stress : Not at all   Social Connections: Unknown    Frequency of Communication with Friends and Family: More than three times a week    Frequency of Social Gatherings with Friends and Family: Three times a week    Active Member of Clubs or Organizations: No    Attends Club or Organization Meetings: Never    Marital Status:    Housing Stability: Low Risk     Unable to Pay for Housing in the Last Year: No    Number of Places Lived in the Last Year: 1    Unstable Housing in the Last Year: No       Review of patient's allergies indicates:   Allergen Reactions    Clams Nausea And Vomiting    Oyster shell Diarrhea and Nausea And Vomiting       Current Outpatient Medications:     ascorbic acid, vitamin C, (VITAMIN C) 500 MG tablet, Take 500 mg by mouth once daily., Disp: , Rfl:     b complex vitamins tablet, Take 1 tablet by mouth once daily., Disp: , Rfl:     biotin 10,000 mcg Cap, Take 1 capsule by mouth once daily., Disp: , Rfl:     citalopram (CELEXA) 20 MG tablet, Take 1 tablet (20 mg total) by mouth once daily., Disp: , Rfl:     co-enzyme Q-10 30 mg capsule, Take 100 mg by mouth once daily., Disp: , Rfl:     cyanocobalamin (VITAMIN B-12) 1000 MCG tablet, Take 100 mcg by mouth  once daily., Disp: , Rfl:     folic acid (FOLVITE) 400 MCG tablet, Take 400 mcg by mouth once daily., Disp: , Rfl:     furosemide (LASIX) 20 MG tablet, Take 1 tablet (20 mg total) by mouth once daily., Disp: 90 tablet, Rfl: 3    krill/om-3/dha/epa/phospho/ast (MAXIMUM RED KRILL OMEGA-3 ORAL), Take 2 capsules by mouth once daily., Disp: , Rfl:     multivitamin (THERAGRAN) per tablet, Take 1 tablet by mouth once daily., Disp: , Rfl:     omeprazole (PRILOSEC) 40 MG capsule, TAKE 1 CAPSULE BY MOUTH 2 TIMES DAILY BEFORE MEALS., Disp: 60 capsule, Rfl: 0    vit A/vit C/vit E/zinc/copper (PRESERVISION AREDS ORAL), Take by mouth., Disp: , Rfl:     vitamin D 1000 units Tab, Take 1,000 mg by mouth. , Disp: , Rfl:     vitamin E 400 UNIT capsule, Take 400 Units by mouth once daily., Disp: , Rfl:     Review of Systems   Constitutional: Negative for fever, malaise/fatigue and night sweats.   HENT:  Negative for congestion and nosebleeds.    Eyes:  Negative for blurred vision and visual disturbance.   Cardiovascular:  Positive for dyspnea on exertion. Negative for chest pain, claudication, cyanosis, irregular heartbeat, leg swelling, near-syncope, orthopnea, palpitations, paroxysmal nocturnal dyspnea and syncope.   Respiratory:  Negative for cough, hemoptysis, shortness of breath and wheezing.    Endocrine: Negative for cold intolerance, heat intolerance and polyuria.   Hematologic/Lymphatic: Negative for adenopathy. Does not bruise/bleed easily.   Skin:  Negative for color change, itching and nail changes.   Musculoskeletal:  Positive for back pain. Negative for falls.   Gastrointestinal:  Negative for abdominal pain, change in bowel habit, constipation, dysphagia, jaundice, melena and vomiting.   Genitourinary:  Negative for dysuria and flank pain.   Neurological:  Negative for brief paralysis, dizziness, focal weakness, light-headedness, loss of balance, numbness, paresthesias, tremors and weakness.   Psychiatric/Behavioral:   "Negative for altered mental status, depression and memory loss.    Allergic/Immunologic: Negative for hives and persistent infections.      Objective:      Vitals:    03/10/23 1108   BP: (!) 141/88   Pulse: 83   Weight: 85.4 kg (188 lb 4.4 oz)   Height: 5' 2" (1.575 m)   PainSc: 0-No pain     Body mass index is 34.44 kg/m².    Physical Exam  Constitutional:       Appearance: Normal appearance. She is obese.   HENT:      Head: Normocephalic and atraumatic.   Eyes:      Extraocular Movements: Extraocular movements intact.      Conjunctiva/sclera: Conjunctivae normal.   Neck:      Vascular: No carotid bruit.   Cardiovascular:      Rate and Rhythm: Regular rhythm.      Pulses: Normal pulses.      Heart sounds:     No friction rub. No gallop.   Pulmonary:      Effort: Pulmonary effort is normal.      Breath sounds: Normal breath sounds.   Abdominal:      Palpations: Abdomen is soft.      Tenderness: There is no abdominal tenderness.   Musculoskeletal:      Cervical back: Neck supple.      Right lower leg: No edema.      Left lower leg: No edema.   Skin:     General: Skin is warm and dry.      Capillary Refill: Capillary refill takes less than 2 seconds.   Neurological:      General: No focal deficit present.      Mental Status: She is alert and oriented to person, place, and time.   Psychiatric:         Mood and Affect: Mood normal.         Behavior: Behavior normal.             ..    Chemistry        Component Value Date/Time     02/14/2023 0941    K 4.0 02/14/2023 0941     02/14/2023 0941    CO2 33 (H) 02/14/2023 0941    BUN 21 (H) 02/14/2023 0941    CREATININE 0.98 02/14/2023 0941     (H) 02/14/2023 0941        Component Value Date/Time    CALCIUM 9.4 02/14/2023 0941    ALKPHOS 93 07/27/2022 0948    AST 30 07/27/2022 0948    AST 23 03/22/2016 0842    ALT 20 07/27/2022 0948    BILITOT 0.6 07/27/2022 0948    ESTGFRAFRICA >60 07/27/2022 0948    EGFRNONAA 56 (A) 07/27/2022 0948            ..  Lab " Results   Component Value Date    CHOL 189 07/27/2022    CHOL 206 (H) 07/20/2021    CHOL 191 07/09/2020     Lab Results   Component Value Date    HDL 52 07/27/2022    HDL 59 07/20/2021    HDL 48 07/09/2020     Lab Results   Component Value Date    LDLCALC 87.2 07/27/2022    LDLCALC 90.8 07/20/2021    LDLCALC 77.6 07/09/2020     Lab Results   Component Value Date    TRIG 249 (H) 07/27/2022    TRIG 281 (H) 07/20/2021    TRIG 327 (H) 07/09/2020     Lab Results   Component Value Date    CHOLHDL 27.5 07/27/2022    CHOLHDL 28.6 07/20/2021    CHOLHDL 25.1 07/09/2020     ..  Lab Results   Component Value Date    WBC 6.21 07/27/2022    HGB 14.0 07/27/2022    HCT 40.1 07/27/2022    MCV 95 07/27/2022     07/27/2022       Test(s) Reviewed  I have reviewed the following in detail:  [] Stress test   [] Angiography   [x] Echocardiogram   [] Labs   [x] Other:       Assessment:         ICD-10-CM ICD-9-CM   1. SOB (shortness of breath)  R06.02 786.05   2. Right ventricular enlargement  I51.7 429.3   3. Encounter for pre-operative cardiovascular clearance  Z01.810 V72.81   4. Nonspecific abnormal electrocardiogram (ECG) (EKG)  R94.31 794.31   5. Essential hypertension  I10 401.9   6. Obesity, Class I, BMI 30-34.9  E66.9 278.00   7. Diastolic dysfunction  I51.89 429.9     Problem List Items Addressed This Visit          Cardiac/Vascular    Essential hypertension    WILKES (dyspnea on exertion) - Primary    Right ventricular enlargement    Nonspecific abnormal electrocardiogram (ECG) (EKG)    Relevant Orders    Nuclear Stress - Cardiology Interpreted    Encounter for pre-operative cardiovascular clearance    Relevant Orders    Nuclear Stress - Cardiology Interpreted    Diastolic dysfunction       Endocrine    Obesity, Class I, BMI 30-34.9        Plan:     EKG SR, SEPTAL Q,LOW RISK SURGERY, SUPPOSED TO BE WITH HEAVY CONSCIOUS SEDATION I.E. PROPOFOL HYDRATION, WOULD LIKE TO CHECK NUCLEAR STRESS TEST TO ASSESS FOR ISCHEMIA AND RISK  STRATIFICATION IN VIEW OF SYMPTOMS , ANGINA EQUIVALENT, NO OVERT HEART FAILURE NO TIA TYPE SYMPTOMS NO NEAR-SYNCOPE DIET EXERCISE DISCUSSED PLAN WITH THE PATIENT AND HER       SOB (shortness of breath)  Comments:  EVALUATE  Orders:  -     Nuclear Stress - Cardiology Interpreted; Future    Right ventricular enlargement    Encounter for pre-operative cardiovascular clearance  -     Nuclear Stress - Cardiology Interpreted; Future    Nonspecific abnormal electrocardiogram (ECG) (EKG)  -     Nuclear Stress - Cardiology Interpreted; Future    Essential hypertension    Obesity, Class I, BMI 30-34.9    Diastolic dysfunction    RTC Low level/low impact aerobic exercise 5x's/wk. Heart healthy diet and risk factor modification.    See labs and med orders.    Aerobic exercise 5x's/wk. Heart healthy diet and risk factor modification.    See labs and med orders.

## 2023-03-22 ENCOUNTER — OFFICE VISIT (OUTPATIENT)
Dept: OPTOMETRY | Facility: CLINIC | Age: 78
End: 2023-03-22
Payer: MEDICARE

## 2023-03-22 DIAGNOSIS — H43.813 POSTERIOR VITREOUS DETACHMENT, BILATERAL: Primary | ICD-10-CM

## 2023-03-22 DIAGNOSIS — Z13.5 GLAUCOMA SCREENING: ICD-10-CM

## 2023-03-22 DIAGNOSIS — Z96.1 BILATERAL PSEUDOPHAKIA: ICD-10-CM

## 2023-03-22 DIAGNOSIS — Z98.890 S/P LASIK SURGERY OF BOTH EYES: ICD-10-CM

## 2023-03-22 PROCEDURE — 1159F MED LIST DOCD IN RCRD: CPT | Mod: CPTII,S$GLB,, | Performed by: OPTOMETRIST

## 2023-03-22 PROCEDURE — 3288F FALL RISK ASSESSMENT DOCD: CPT | Mod: CPTII,S$GLB,, | Performed by: OPTOMETRIST

## 2023-03-22 PROCEDURE — 92015 PR REFRACTION: ICD-10-PCS | Mod: S$GLB,,, | Performed by: OPTOMETRIST

## 2023-03-22 PROCEDURE — 1101F PT FALLS ASSESS-DOCD LE1/YR: CPT | Mod: CPTII,S$GLB,, | Performed by: OPTOMETRIST

## 2023-03-22 PROCEDURE — 92004 COMPRE OPH EXAM NEW PT 1/>: CPT | Mod: S$GLB,,, | Performed by: OPTOMETRIST

## 2023-03-22 PROCEDURE — 1126F PR PAIN SEVERITY QUANTIFIED, NO PAIN PRESENT: ICD-10-PCS | Mod: CPTII,S$GLB,, | Performed by: OPTOMETRIST

## 2023-03-22 PROCEDURE — 1159F PR MEDICATION LIST DOCUMENTED IN MEDICAL RECORD: ICD-10-PCS | Mod: CPTII,S$GLB,, | Performed by: OPTOMETRIST

## 2023-03-22 PROCEDURE — 92004 PR EYE EXAM, NEW PATIENT,COMPREHESV: ICD-10-PCS | Mod: S$GLB,,, | Performed by: OPTOMETRIST

## 2023-03-22 PROCEDURE — 99999 PR PBB SHADOW E&M-EST. PATIENT-LVL III: ICD-10-PCS | Mod: PBBFAC,,, | Performed by: OPTOMETRIST

## 2023-03-22 PROCEDURE — 99999 PR PBB SHADOW E&M-EST. PATIENT-LVL III: CPT | Mod: PBBFAC,,, | Performed by: OPTOMETRIST

## 2023-03-22 PROCEDURE — 1160F PR REVIEW ALL MEDS BY PRESCRIBER/CLIN PHARMACIST DOCUMENTED: ICD-10-PCS | Mod: CPTII,S$GLB,, | Performed by: OPTOMETRIST

## 2023-03-22 PROCEDURE — 3288F PR FALLS RISK ASSESSMENT DOCUMENTED: ICD-10-PCS | Mod: CPTII,S$GLB,, | Performed by: OPTOMETRIST

## 2023-03-22 PROCEDURE — 1126F AMNT PAIN NOTED NONE PRSNT: CPT | Mod: CPTII,S$GLB,, | Performed by: OPTOMETRIST

## 2023-03-22 PROCEDURE — 1160F RVW MEDS BY RX/DR IN RCRD: CPT | Mod: CPTII,S$GLB,, | Performed by: OPTOMETRIST

## 2023-03-22 PROCEDURE — 92015 DETERMINE REFRACTIVE STATE: CPT | Mod: S$GLB,,, | Performed by: OPTOMETRIST

## 2023-03-22 PROCEDURE — 1101F PR PT FALLS ASSESS DOC 0-1 FALLS W/OUT INJ PAST YR: ICD-10-PCS | Mod: CPTII,S$GLB,, | Performed by: OPTOMETRIST

## 2023-03-22 NOTE — PATIENT INSTRUCTIONS
"DRY EYES -- BURNING OR MILAN SYMPTOMS:  Use Over The Counter artificial tears as needed for dry eye symptoms.   Some common brands include:  Systane, Optive, Refresh, and Thera-Tears.  These drops can be used as frequently as desired, but may be most helpful use during long periods of concentrated work.  For example, reading / working at the computer. Start with 3-4x per day.     Nighttime Ophthalmic gel or ointments are available: Refresh PM, Genteal, and Lacrilube.    Avoid drops that "get redness out" (Visine, Murine, Clear Eyes), as these may contain medication that could further irritate the eyes, especially with chronic use.    ALLERGY EYES -- ITCHING SYMPTOMS:  Over the counter medications include--Pataday, Zaditor, and Alaway.  Use as directed 1-2 drops daily for symptoms of itching / watering eyes.  These drops will not help for dry eye or exposure symptoms.    REDNESS RELIEF:  Lumify---is a good redness reliever that will not cause irritation if used chronically.        FLASHES / FLOATERS / POSTERIOR VITREOUS DETACHMENT    Call the clinic if you have any further changes in symptoms.  Including:  Increased numbers of floaters or flashing lights, dimness or darkness that moves through or stays constant in your vision, or any pain in the eye (s).    You may sometimes see small specks or clouds moving in your field of vision.  They are called FLOATERS.  You can often see them when looking at a plain background, like a blank wall or blue maximilian.  Floaters are actually tiny clumps of gel or cells inside the VITREOUS, the clear jelly-like fluid that fills the inside of your eye.    While these objects look like they are in front of your eye, they are actually floating inside.  What you see are the shadows they cast on the RETINA, the nerve layer at the back of the eye that senses light and allows you to see.      POSTERIOR VITREOUS DETACHMENT    The appearance of new floaters may be alarming.  If you suddenly " develop new floaters, you should contact your eye care professional  right away.    The retina can tear if the shrinking vitreous pulls away from the wall of the eye.  This sometimes causes a small amount of bleeding in the eye that may appear as new floaters.    A torn retina is always a serious problem, since it can lead to a retinal detachment.  You should see your eye care professional as soon as possible if:    even one new floater appears suddenly;  you see sudden flashes of light;  you notice other symptoms, like the loss of side vision, or a curtain closes down in your vision        POSTERIOR VITREOUS DETACHMENT is more common for people who:    are nearsighted;  have had cataract surgery;  have had YAG laser surgery of the eye;  have had inflammation inside the eye;  are over age 60.      While some floaters may remain visible, many of them will fade over time and become less noticeable.  Even if you've had some floaters for years, you should have your eyes checked as soon as possible if you notice new ones.    FLASHING LIGHTS    When the vitreous gel rubs or pulls on the retina, you may see what look like flashing lights or lightning streaks.  These flashes can appear off and on for several weeks or months.      Some people experience flashes of light that appear as jagged lines or heat waves in both eyes, lasting 10-20 minutes.  These flashes are caused by a spasm of blood vessels in the brain, which is called a migraine.    If a headache follows these flashes, it's called a migraine headache.  If   no headache occurs, these flashes are called Ophthalmic or Ocular Migraine.

## 2023-03-22 NOTE — PROGRESS NOTES
"HPI    NP/Last eye exam was 1 year ago with Dr. Castro  + Lasik 2000 "not sure of which eye"  + Phaco OU mono IOL  + HTN "yes and no"    Denies blurry VA, pain, flashes, or floaters.    Refresh gel PRN OU    Agree above  CE w/ pciol, OD w/ 2nd implant   Last edited by SHAWNEE Costello, OD on 3/22/2023  1:50 PM.            Assessment /Plan     For exam results, see Encounter Report.    Posterior vitreous detachment, bilateral    Glaucoma screening    Bilateral pseudophakia    S/P LASIK surgery of both eyes      RD precautions given and reviewed. Patient knows to call/ message if any further changes in symptoms occur.  Not suspect w/ low IOP and c/d  Stable OU, s/p 2nd implant OD   Longstanding ~ 2000 w/ good stable result     Good macular health for age       Discussed and educated patient on current findings /plan.  RTC 1 year, prn if any changes / issues                           "

## 2023-05-15 ENCOUNTER — OFFICE VISIT (OUTPATIENT)
Dept: DERMATOLOGY | Facility: CLINIC | Age: 78
End: 2023-05-15
Payer: MEDICARE

## 2023-05-15 DIAGNOSIS — L81.4 LENTIGINES: Primary | ICD-10-CM

## 2023-05-15 DIAGNOSIS — D18.01 CHERRY ANGIOMA: ICD-10-CM

## 2023-05-15 DIAGNOSIS — L82.1 SK (SEBORRHEIC KERATOSIS): ICD-10-CM

## 2023-05-15 DIAGNOSIS — L73.8 SEBACEOUS HYPERPLASIA: ICD-10-CM

## 2023-05-15 DIAGNOSIS — Z12.83 SCREENING FOR SKIN CANCER: ICD-10-CM

## 2023-05-15 PROCEDURE — 3288F FALL RISK ASSESSMENT DOCD: CPT | Mod: CPTII,S$GLB,, | Performed by: DERMATOLOGY

## 2023-05-15 PROCEDURE — 1159F MED LIST DOCD IN RCRD: CPT | Mod: CPTII,S$GLB,, | Performed by: DERMATOLOGY

## 2023-05-15 PROCEDURE — 1101F PT FALLS ASSESS-DOCD LE1/YR: CPT | Mod: CPTII,S$GLB,, | Performed by: DERMATOLOGY

## 2023-05-15 PROCEDURE — 99203 OFFICE O/P NEW LOW 30 MIN: CPT | Mod: S$GLB,,, | Performed by: DERMATOLOGY

## 2023-05-15 PROCEDURE — 99203 PR OFFICE/OUTPT VISIT, NEW, LEVL III, 30-44 MIN: ICD-10-PCS | Mod: S$GLB,,, | Performed by: DERMATOLOGY

## 2023-05-15 PROCEDURE — 1101F PR PT FALLS ASSESS DOC 0-1 FALLS W/OUT INJ PAST YR: ICD-10-PCS | Mod: CPTII,S$GLB,, | Performed by: DERMATOLOGY

## 2023-05-15 PROCEDURE — 1159F PR MEDICATION LIST DOCUMENTED IN MEDICAL RECORD: ICD-10-PCS | Mod: CPTII,S$GLB,, | Performed by: DERMATOLOGY

## 2023-05-15 PROCEDURE — 3288F PR FALLS RISK ASSESSMENT DOCUMENTED: ICD-10-PCS | Mod: CPTII,S$GLB,, | Performed by: DERMATOLOGY

## 2023-05-15 PROCEDURE — 99999 PR PBB SHADOW E&M-EST. PATIENT-LVL II: ICD-10-PCS | Mod: PBBFAC,,, | Performed by: DERMATOLOGY

## 2023-05-15 PROCEDURE — 99999 PR PBB SHADOW E&M-EST. PATIENT-LVL II: CPT | Mod: PBBFAC,,, | Performed by: DERMATOLOGY

## 2023-05-15 NOTE — PROGRESS NOTES
Subjective:      Patient ID:  Julia Ward is a 78 y.o. female who presents for No chief complaint on file.    HPI    New Patient  Here for today [facility].  C/O spot in corner right eye x 4 months, wants removal.  No history of skin cancer for herself or family    Review of Systems    Objective:   Physical Exam   Constitutional: She appears well-developed and well-nourished. She is cooperative.   HENT:   Head: Normocephalic and atraumatic.   Eyes: Lids are normal. Lids are normal.  Right conjunctiva is not injected. Left conjunctiva is not injected. No conjunctival no injection.   Pulmonary/Chest: Effort normal. No respiratory distress.   Neurological: She is alert and oriented to person, place, and time.   Psychiatric: She has a normal mood and affect. Her speech is normal and behavior is normal. Mood, memory, affect and thought content normal.   Skin:   Areas Examined (abnormalities noted in diagram):   Scalp / Hair Palpated and Inspected  Head / Face Inspection Performed  Neck Inspection Performed  Chest / Axilla Inspection Performed  Back Inspection Performed  RUE Inspected  LUE Inspection Performed  Nails and Digits Inspection Performed               Diagram Legend     Erythematous scaling macule/papule c/w actinic keratosis       Vascular papule c/w angioma      Pigmented verrucoid papule/plaque c/w seborrheic keratosis      Yellow umbilicated papule c/w sebaceous hyperplasia      Irregularly shaped tan macule c/w lentigo     1-2 mm smooth white papules consistent with Milia      Movable subcutaneous cyst with punctum c/w epidermal inclusion cyst      Subcutaneous movable cyst c/w pilar cyst      Firm pink to brown papule c/w dermatofibroma      Pedunculated fleshy papule(s) c/w skin tag(s)      Evenly pigmented macule c/w junctional nevus     Mildly variegated pigmented, slightly irregular-bordered macule c/w mildly atypical nevus      Flesh colored to evenly pigmented papule c/w intradermal nevus        Pink pearly papule/plaque c/w basal cell carcinoma      Erythematous hyperkeratotic cursted plaque c/w SCC      Surgical scar with no sign of skin cancer recurrence      Open and closed comedones      Inflammatory papules and pustules      Verrucoid papule consistent consistent with wart     Erythematous eczematous patches and plaques     Dystrophic onycholytic nail with subungual debris c/w onychomycosis     Umbilicated papule    Erythematous-base heme-crusted tan verrucoid plaque consistent with inflamed seborrheic keratosis     Erythematous Silvery Scaling Plaque c/w Psoriasis     See annotation      Assessment / Plan:        Lentigines  - Benign; reassured treatment not necessary.   - Recommended daily sun protection, including the use of OTC broad-spectrum sunscreen (SPF 30 or greater) and sun-protective clothing.       SK (seborrheic keratosis)  Cherry angioma  Sebaceous hyperplasia  - Benign; reassured treatment not necessary.      Screening for skin cancer  - Upper body skin examination performed today.  - Findings listed above.   - Recommended routine self examination of skin.    - Recommended daily sun protection, including the use of OTC broad-spectrum sunscreen (SPF 30 or greater) and sun-protective clothing.             Follow up for annual skin checks, sooner PRN.

## 2023-05-15 NOTE — PATIENT INSTRUCTIONS
What Are the Symptoms of Skin Cancer?  A change in your skin is the most common sign of skin cancer. This could be a new growth, a sore that doesnt heal, or a change in a mole. Not all skin cancers look the same.    For melanoma specifically, a simple way to remember the warning signs is to remember the A-B-C-D-Es of melanoma--    A stands for asymmetrical. Does the mole or spot have an irregular shape with two parts that look very different?  B stands for border. Is the border irregular or jagged?  C is for color. Is the color uneven?  D is for diameter. Is the mole or spot larger than the size of a pea?  E is for evolving. Has the mole or spot changed during the past few weeks or months?    Talk to your doctor if you notice changes in your skin such as a new growth, a sore that doesnt heal, a change in an old growth, or any of the A-B-C-D-Es of melanoma    What Can I Do to Reduce My Risk of Skin Cancer?  Protection from ultraviolet (UV) radiation is important all year, not just during the summer or at the beach. UV rays from the sun can reach you on cloudy and hazy days, not just on bright and darrell days. UV rays also reflect off of surfaces like water, cement, sand, and snow. Indoor tanning (using a tanning bed, hughes, or sunlamp to get tan) exposes users to UV radiation.    The hours between 10 a.m. and 4 p.m. Daylight Saving Time (9 a.m. to 3 p.m. standard time) are the most hazardous for UV exposure outdoors in the continental [place]. UV rays from sunlight are the greatest during the late spring and early summer in [place].    CDC recommends easy options for protection from UV radiation--    Stay in the shade or indoors, especially during midday hours.  Wear clothing that covers your arms and legs.  Wear a hat with a wide brim to shade your face, head, ears, and neck.  Wear sunglasses that wrap around and block both UVA and UVB rays.  Use sunscreen with a sun protection factor (SPF) of  30 or higher, and both UVA and UVB (broad spectrum) protection.  Avoid indoor tanning.    Adapted from https://www.cdc.gov/cancer/skin/basic_info/

## 2024-01-17 ENCOUNTER — CLINICAL SUPPORT (OUTPATIENT)
Dept: REHABILITATION | Facility: HOSPITAL | Age: 79
End: 2024-01-17
Payer: MEDICARE

## 2024-01-17 DIAGNOSIS — M25.611 DECREASED ROM OF RIGHT SHOULDER: Primary | ICD-10-CM

## 2024-01-17 DIAGNOSIS — S42.291A FRACTURE OF HUMERAL HEAD, CLOSED, RIGHT, INITIAL ENCOUNTER: ICD-10-CM

## 2024-01-17 DIAGNOSIS — Z91.81 RISK FOR FALLS: ICD-10-CM

## 2024-01-17 PROCEDURE — 97161 PT EVAL LOW COMPLEX 20 MIN: CPT | Mod: PO

## 2024-01-17 PROCEDURE — 97110 THERAPEUTIC EXERCISES: CPT | Mod: PO

## 2024-01-17 PROCEDURE — 97112 NEUROMUSCULAR REEDUCATION: CPT | Mod: PO

## 2024-01-17 NOTE — PLAN OF CARE
"[facility]  Physical Therapy Initial Evaluation    Date: 1/17/2024   Name: Julai PRAJAPATI [facility]  Clinic Number: [id number]    Therapy Diagnosis:   Encounter Diagnoses   Name Primary?    Fracture of humeral head, closed, right, initial encounter     Decreased ROM of right shoulder Yes     Physician: Jalen Villanueva MD    Physician Orders: PT Eval and Treat   Medical Diagnosis from Referral: Fracture of humeral head, closed, right, initial encounter   Evaluation Date: 1/17/2024  Authorization Period Expiration: 12/27/2024  Plan of Care Expiration: 03/17/2023  Progress Note Due: 02/17/2023  Visit # / Visits authorized: 1/ 1   FOTO: 1/3    Precautions: Standard   MD orders:  I think at this point we will start her on physical therapy requesting passive range of motion for the 1st week and then to begin active and active assisted range of motion following that.  I will see her back in 1 month     Time In: 1000  Time Out: 1100  Total Appointment Time (timed & untimed codes): 60 minutes      SUBJECTIVE   Date of onset: 11/25/2023    History of current condition - Julia reports: she fell on 11/25 and fx her right humeral head. Reports she was immobilized for 8 weeks. She had been doing like passive exercise for her shoulder without pain. Reports 5 falls in last 2 years. Activities that are difficult: Reaching behind back,  lifting, getting dressed, and doing her.     Falls: 5 in last two     Imaging, x-ray: see imaging    Prior Therapy: none  Social History:  lives with their spouse  Occupation: retired   Prior Level of Function: completely limitation free on right shoulder  Current Level of Function: ~75% rom limited     Pain:  Current 0/10, worst 2/10, best 0/10   Location: right shoulder   Description: Aching and Dull  Aggravating Factors: see above   Easing Factors: rest    Patients goals: " restore range"     Medical History:   Past Medical History:   Diagnosis Date    Anxiety     C. difficile " diarrhea ~2012    Encounter for blood transfusion     GERD (gastroesophageal reflux disease)     Hypertension        Surgical History:   Julia Ward  has a past surgical history that includes Total hip arthroplasty (Right, 09/2009); Back surgery (08/2013); Hysterectomy (1988); Tonsillectomy; Total abdominal hysterectomy w/ bilateral salpingoophorectomy (Bilateral, 09/2013); Total knee arthroplasty (Left); Cardiac catheterization; Joint replacement (Left, 08/2008); Joint replacement (Left, 11/13/2018); Joint replacement (Right, 2009); Hip Arthroplasty (Left, 11/13/2018); Breast biopsy (Left, 07/26/2013); Colonoscopy (03/01/2010); Refractive surgery (Bilateral, 2000); and Cataract extraction (Bilateral, 2017).    Medications:   Julia has a current medication list which includes the following prescription(s): amlodipine, ascorbic acid (vitamin c), b complex vitamins, biotin, citalopram, co-enzyme q-10, cyanocobalamin, folic acid, hydrochlorothiazide, krill/om-3/dha/epa/phospho/ast, multivitamin, omeprazole, oxycodone-acetaminophen, salmon oil-1000, vit a/vit c/vit e/zinc/copper, vitamin d, and vitamin e.    Allergies:   Review of patient's allergies indicates:   Allergen Reactions    Clams Nausea And Vomiting    Oyster shell Diarrhea and Nausea And Vomiting        Objective       Active Range of Motion: Measured in degrees      Shoulder Left Right   Flexion 170 45   Abduction 165 30   Flexion + ER T3 50% limited   Extension + IR T11 50% limited         Passive Range of Motion: Measured in degrees      Shoulder Left Right   Flexion 180 90   Abduction 180 70   ER in scaption 90 45   IR 75 45       Upper Extremity Strength  Elbow Left Right   Forearm pronation 4/5 4-/5   Forearm supination 4/5 4-/5   Biceps 4/5 4-/5   Triceps 4/5 4-/5       Shoulder Left Right   Shoulder flexion: 4/5 3-/5   Shoulder Abduction: 4/5 3-/5   Shoulder ER 4/5 3-/5   Shoulder IR 4+/5 3/5   Lower Trap 3/5 3/5   Middle Trap 3/5 3/5   Rhomboids  3/5 3/5       Scapular Control/Dyskinesis:    Normal / Subtle / Obvious    Left  Obvious    Right  Obvious      Rehab Tests  Outcome Norms GOAL   DGI  14/24 <19/24 fall risk 20           CMS Impairment/Limitation/Restriction for FOTO shoulder Survey    Therapist reviewed FOTO scores for Julia Ward on 1/17/2024.   FOTO documents entered into [employer] - see Media section.    Limitation Score: 51%  Category: Mobility         TREATMENT      Total Treatment time separate from Evaluation: 25 minutes      Next visit: falls and shoulder rom/strength     Julia received therapeutic exercises to develop ROM, flexibility, and posture for 15 minutes including:  HEP instruction and review:  -BPPV maneuver   -Active assisted mobility using cane: shoulder flexion, ER  -sleep stretch 3x30s          Julia participated in neuromuscular re-education activities to improve: Kinesthetic, Sense, and Proprioception for 10 minutes. The following activities were included:  Eply's maneuver for BPPV like sx x3 = reduction in dizziness         Home Exercises and Patient Education Provided    Education provided:   - Role of PT, PT POC    Written Home Exercises Provided: yes.  Exercises were reviewed and Julia was able to demonstrate them prior to the end of the session.  Julia demonstrated good  understanding of the education provided.     See EMR under Patient Instructions for exercises provided 1/17/2024.    Assessment   Julia is a 78 y.o. female referred to outpatient Physical Therapy with a medical diagnosis of Fracture of humeral head, closed, right, initial encounter . Physical exam is consistent with referring dx. Primary impairments include AROM, PROM, joint mobility, strength, soft tissue restrictions, and pain which limits functional mobility. Patient's fall risk also needs to be addressed to lower risks for future falls. This pt is a good candidate for skilled PT tx and stands to benefit from a combination of manual therapy including  joint mobilizations with trigger point/myofacscial release, therapeutic exercise to establish core/joint stability, neuromuscular re-education, dry needling, and modalities Prn. The pt has been educated on their dx/POC and consents to further PT tx.    Pt prognosis is Good.   Pt will benefit from skilled outpatient Physical Therapy to address the deficits stated above and in the chart below, provide pt/family education, and to maximize pt's level of independence.     Plan of care discussed with patient: Yes  Pt's spiritual, cultural and educational needs considered and patient is agreeable to the plan of care and goals as stated below:     Anticipated Barriers for therapy: multiple falls      Medical Necessity is demonstrated by the following  History  Co-morbidities and personal factors that may impact the plan of care [] LOW: no personal factors / co-morbidities  [x] MODERATE: 1-2 personal factors / co-morbidities  [] HIGH: 3+ personal factors / co-morbidities    Moderate / High Support Documentation:   Co-morbidities affecting plan of care: see above    Personal Factors:   age     Examination  Body Structures and Functions, activity limitations and participation restrictions that may impact the plan of care [] LOW: addressing 1-2 elements  [x] MODERATE: 3+ elements  [] HIGH: 4+ elements (please support below)    Moderate / High Support Documentation: fall risk     Clinical Presentation [x] LOW: stable  [] MODERATE: Evolving  [] HIGH: Unstable     Decision Making/ Complexity Score: low       Goals:  Short Term Goals (<4 Weeks):   1. Pt to demonstrate compliance with initial HEP to improve therapeutic potential.  2. Pt to improve gross shoulder motion by 25% to allow for improved functional mobility.     Long Term Goals (<8 Weeks):   1. Pt to achieve <40% disability as measured by the FOTO to demonstrate decreased disability with functional activities.   2. Pt to increase strength to at least 4/5 of muscles tested  to allow for improvement in functional activities.  3. Pt to improve gross shoulder motion to <10% limitations compared to contralateral shoulder to allow for improved functional mobility with performing ADL's   4. Pt to report compliance with HEP 3x/week and demonstrate proper exercise technique to PT to show independence with self management of condition.  5. Improve DGI by at least 6 points to work towards lowering fall risk.    PLAN   Plan of care Certification: 1/17/2024 to 03/17/2024.    Outpatient Physical Therapy 1-2 times weekly for 8 weeks to include the following interventions: Electrical Stimulation prn, Manual Therapy, Moist Heat/ Ice, Neuromuscular Re-ed, Patient Education, Self Care, Therapeutic Activities, Therapeutic Exercise, and Dry Needling .     Maurizio Mitchell, PT, DPT      I CERTIFY THE NEED FOR THESE SERVICES FURNISHED UNDER THIS PLAN OF TREATMENT AND WHILE UNDER MY CARE   Physician's comments:     Physician's Signature: ___________________________________________________

## 2024-01-24 ENCOUNTER — CLINICAL SUPPORT (OUTPATIENT)
Dept: REHABILITATION | Facility: HOSPITAL | Age: 79
End: 2024-01-24
Payer: MEDICARE

## 2024-01-24 DIAGNOSIS — M25.611 DECREASED ROM OF RIGHT SHOULDER: ICD-10-CM

## 2024-01-24 DIAGNOSIS — S42.291A FRACTURE OF HUMERAL HEAD, CLOSED, RIGHT, INITIAL ENCOUNTER: Primary | ICD-10-CM

## 2024-01-24 PROCEDURE — 97140 MANUAL THERAPY 1/> REGIONS: CPT | Mod: PO,CQ

## 2024-01-24 PROCEDURE — 97110 THERAPEUTIC EXERCISES: CPT | Mod: PO,CQ

## 2024-01-24 NOTE — PROGRESS NOTES
[facility]   Physical Therapy Treatment Note     Name: Julia PRAJAPATI [facility] Number: [id number]    Therapy Diagnosis:   Encounter Diagnoses   Name Primary?    Fracture of humeral head, closed, right, initial encounter Yes    Decreased ROM of right shoulder      Physician: No ref. provider found    Visit Date: 1/24/2024    Physician: Jalen Villanueva MD     Physician Orders: PT Eval and Treat   Medical Diagnosis from Referral: Fracture of humeral head, closed, right, initial encounter   Evaluation Date: 1/17/2024  Authorization Period Expiration: 12/27/2024  Plan of Care Expiration: 03/17/2023  Progress Note Due: 02/17/2023  Visit # / Visits authorized: 1/ 1   FOTO: 1/3     Precautions: Standard   MD orders:  I think at this point we will start her on physical therapy requesting passive range of motion for the 1st week and then to begin active and active assisted range of motion following that.  I will see her back in 1 month     Time In: 10:00 am  Time Out: 10:55 am  Total Appointment Time (timed & untimed codes): 55 minutes     SUBJECTIVE     Pt reports: her R Upper Trap is sore today. She is stretching at home, but needed to stop due to upper trap pain.  She was compliant with home exercise program.  Response to previous treatment: No change   Functional change: None    Pain: 0/10  Location: right shoulder       OBJECTIVE     Objective Measures updated at progress report unless specified.       TREATMENT     Julia received the treatments listed below:        therapeutic activities to improve functional performance for 00  minutes, including:      received therapeutic exercises to develop strength and ROM for 00  minutes including:  Next visit: falls and shoulder rom/strength      Julia received therapeutic exercises to develop ROM, flexibility, and posture for 27 minutes including:  HEP instruction and review:  -BPPV maneuver-NP  -Active assisted mobility using cane: shoulder flexion  10x, ER  -sleeper stretch 3x30s     neuromuscular re-education activities to improve: Coordination, Kinesthetic, Sense, Proprioception, and Posture for 08 minutes. The following activities were included:  Scapular retractions 20x      received the following manual therapy techniques: Soft tissue Mobilization and stretch were applied to the: L shoulder  for 20 minutes, including:  PROM and stretch shoulder flexion,ER and abd   STM to R  bicep,deltoid and UT     PATIENT EDUCATION AND HOME EXERCISES     Home Exercises Provided and Patient Education Provided     Education provided:   - Educated pt on the importance of daily stretch to increase the benefit of therapy and positive results.      Written Home Exercises Provided: Patient instructed to cont prior HEP. Exercises were reviewed and Julia was able to demonstrate them prior to the end of the session.  Julia demonstrated good  understanding of the education provided. See EMR under Patient Instructions for exercises provided during therapy sessions    ASSESSMENT   Pt c/o same R shoulder tightness post session. Slightly more ROM in all motions. Initiated scapular retractions for stretch and begin scap strengthening. Pt with UT and bicep pain and tightness during  passive stretch. Cont to progress per pt tolerance.     Julia Is progressing well towards her goals.   Pt prognosis is Good.     Pt will continue to benefit from skilled outpatient physical therapy to address the deficits listed in the problem list box on initial evaluation, provide pt/family education and to maximize pt's level of independence in the home and community environment.     Pt's spiritual, cultural and educational needs considered and pt agreeable to plan of care and goals.     Anticipated barriers to physical therapy: none    Goals:  Short Term Goals (<4 Weeks):   1. Pt to demonstrate compliance with initial HEP to improve therapeutic potential.  2. Pt to improve gross shoulder motion by 25% to  allow for improved functional mobility.      Long Term Goals (<8 Weeks):   1. Pt to achieve <40% disability as measured by the FOTO to demonstrate decreased disability with functional activities.   2. Pt to increase strength to at least 4/5 of muscles tested to allow for improvement in functional activities.  3. Pt to improve gross shoulder motion to <10% limitations compared to contralateral shoulder to allow for improved functional mobility with performing ADL's   4. Pt to report compliance with HEP 3x/week and demonstrate proper exercise technique to PT to show independence with self management of condition.  5. Improve DGI by at least 6 points to work towards lowering fall risk.    PLAN     Plan of care Certification: 1/17/2024 to 03/17/2024.     Outpatient Physical Therapy 1-2 times weekly for 8 weeks to include the following interventions: Electrical Stimulation prn, Manual Therapy, Moist Heat/ Ice, Neuromuscular Re-ed, Patient Education, Self Care, Therapeutic Activities, Therapeutic Exercise, and Dry Needling .        Concepcion Meyer, PTA

## 2024-01-26 ENCOUNTER — CLINICAL SUPPORT (OUTPATIENT)
Dept: REHABILITATION | Facility: HOSPITAL | Age: 79
End: 2024-01-26
Payer: MEDICARE

## 2024-01-26 DIAGNOSIS — Z91.81 RISK FOR FALLS: ICD-10-CM

## 2024-01-26 DIAGNOSIS — M25.611 DECREASED ROM OF RIGHT SHOULDER: Primary | ICD-10-CM

## 2024-01-26 PROCEDURE — 97112 NEUROMUSCULAR REEDUCATION: CPT | Mod: PO

## 2024-01-26 PROCEDURE — 97110 THERAPEUTIC EXERCISES: CPT | Mod: PO

## 2024-01-26 PROCEDURE — 97140 MANUAL THERAPY 1/> REGIONS: CPT | Mod: PO

## 2024-01-26 NOTE — PROGRESS NOTES
[facility]   Physical Therapy Treatment Note     [id number]    Therapy Diagnosis:   Encounter Diagnoses   Name Primary?    Decreased ROM of right shoulder Yes    Risk for falls        Physician: Jalen Villanueva MD    Visit Date: 1/26/2024    Physician: Jalen Villanueva MD     Physician Orders: PT Eval and Treat   Medical Diagnosis from Referral: Fracture of humeral head, closed, right, initial encounter   Evaluation Date: 1/17/2024  Authorization Period Expiration: 12/27/2024  Plan of Care Expiration: 03/17/2023  Progress Note Due: 02/17/2023  Visit # / Visits authorized: 1/20  FOTO: 1/3     Precautions: Standard   MD orders:  I think at this point we will start her on physical therapy requesting passive range of motion for the 1st week and then to begin active and active assisted range of motion following that.  I will see her back in 1 month     Time In: 10:00 am  Time Out: 10:57 am  Total Appointment Time (timed & untimed codes): 57 minutes     SUBJECTIVE     Pt reports:she has been having some pec soreness over the last couple days.   She was compliant with home exercise program.  Response to previous treatment: No change   Functional change: None    Pain: 0/10  Location: right shoulder       OBJECTIVE     Objective Measures updated at progress report unless specified.       TREATMENT     Julia received the treatments listed below:        therapeutic activities to improve functional performance for 00  minutes, including:      received therapeutic exercises to develop strength and ROM for 00  minutes including:  Next visit: falls and shoulder rom/strength      Julia received therapeutic exercises to develop ROM, flexibility, and posture for 15 minutes including:     Active assisted mobility using cane: shoulder flexion/ER 3x20  sleeper stretch 3x30s     neuromuscular re-education activities to improve: Coordination, Kinesthetic, Sense,  Proprioception, and Posture for 15 minutes. The following activities were included:  Scapular retractions 20x5s  Sidelying ER 1# 3x20      received the following manual therapy techniques: Soft tissue Mobilization and stretch were applied to the: L shoulder  for 25 minutes, including:  PROM and stretch shoulder flexion,ER and abd   STM to R  pec, bicep,deltoid and UT     PATIENT EDUCATION AND HOME EXERCISES     Home Exercises Provided and Patient Education Provided     Education provided:   - Educated pt on the importance of daily stretch to increase the benefit of therapy and positive results.      Written Home Exercises Provided: Patient instructed to cont prior HEP. Exercises were reviewed and Julia was able to demonstrate them prior to the end of the session.  Julia demonstrated good  understanding of the education provided. See EMR under Patient Instructions for exercises provided during therapy sessions    ASSESSMENT   Pec soreness significantly reduced with manual therapy. Shoulder rom progress-educated patient not to over stretch shoulder. Cont to progress per pt tolerance.     Julia Is progressing well towards her goals.   Pt prognosis is Good.     Pt will continue to benefit from skilled outpatient physical therapy to address the deficits listed in the problem list box on initial evaluation, provide pt/family education and to maximize pt's level of independence in the home and community environment.     Pt's spiritual, cultural and educational needs considered and pt agreeable to plan of care and goals.     Anticipated barriers to physical therapy: none    Goals:  Short Term Goals (<4 Weeks):   1. Pt to demonstrate compliance with initial HEP to improve therapeutic potential.  2. Pt to improve gross shoulder motion by 25% to allow for improved functional mobility.      Long Term Goals (<8 Weeks):   1. Pt to achieve <40% disability as measured by the FOTO to demonstrate decreased disability with functional  activities.   2. Pt to increase strength to at least 4/5 of muscles tested to allow for improvement in functional activities.  3. Pt to improve gross shoulder motion to <10% limitations compared to contralateral shoulder to allow for improved functional mobility with performing ADL's   4. Pt to report compliance with HEP 3x/week and demonstrate proper exercise technique to PT to show independence with self management of condition.  5. Improve DGI by at least 6 points to work towards lowering fall risk.    PLAN     Plan of care Certification: 1/17/2024 to 03/17/2024.     Outpatient Physical Therapy 1-2 times weekly for 8 weeks to include the following interventions: Electrical Stimulation prn, Manual Therapy, Moist Heat/ Ice, Neuromuscular Re-ed, Patient Education, Self Care, Therapeutic Activities, Therapeutic Exercise, and Dry Needling .        Maurizio Mitchell, PT, DPT

## 2024-02-02 ENCOUNTER — CLINICAL SUPPORT (OUTPATIENT)
Dept: REHABILITATION | Facility: HOSPITAL | Age: 79
End: 2024-02-02
Payer: MEDICARE

## 2024-02-02 DIAGNOSIS — Z91.81 RISK FOR FALLS: ICD-10-CM

## 2024-02-02 DIAGNOSIS — M25.611 DECREASED ROM OF RIGHT SHOULDER: Primary | ICD-10-CM

## 2024-02-02 PROCEDURE — 97140 MANUAL THERAPY 1/> REGIONS: CPT | Mod: PO,CQ

## 2024-02-02 PROCEDURE — 97112 NEUROMUSCULAR REEDUCATION: CPT | Mod: PO,CQ

## 2024-02-02 PROCEDURE — 97110 THERAPEUTIC EXERCISES: CPT | Mod: PO,CQ

## 2024-02-02 NOTE — PROGRESS NOTES
[facility]   Physical Therapy Treatment Note       [facility] [id number]    Therapy Diagnosis:   Encounter Diagnoses   Name Primary?    Decreased ROM of right shoulder Yes    Risk for falls        Physician: Jalen Villanueva MD    Visit Date: 2/2/2024    Physician: Jalen Villanueva MD     Physician Orders: PT Eval and Treat   Medical Diagnosis from Referral: Fracture of humeral head, closed, right, initial encounter   Evaluation Date: 1/17/2024  Authorization Period Expiration: 12/27/2024  Plan of Care Expiration: 03/17/2023  Progress Note Due: 02/17/2023  Visit # / Visits authorized: 2/20  FOTO: 1/3     Precautions: Standard   MD orders:  I think at this point we will start her on physical therapy requesting passive range of motion for the 1st week and then to begin active and active assisted range of motion following that.  I will see her back in 1 month     Time In: 11:00 am  Time Out: 12:00 pm   Total Appointment Time (timed & untimed codes): 60 minutes     SUBJECTIVE     Pt reports:she has been having some pec soreness over the last couple days.   She was compliant with home exercise program.  Response to previous treatment: No change   Functional change: None    Pain: 2/10  Location: right shoulder       OBJECTIVE     Objective Measures updated at progress report unless specified.       TREATMENT     Julia received the treatments listed below:        therapeutic activities to improve functional performance for 00  minutes, including:      received therapeutic exercises to develop strength and ROM for 00  minutes including:  Next visit: falls and shoulder rom/strength      Julia received therapeutic exercises to develop ROM, flexibility, and posture for 20 minutes including:     Active assisted mobility using cane: shoulder flexion/ER 2x20  sleeper stretch 2x30s     neuromuscular re-education activities to improve: Coordination, Kinesthetic, Sense,  Proprioception, and Posture for 15 minutes. The following activities were included:  Scapular retractions 20x5s  Sidelying ER 1# 3x20      received the following manual therapy techniques: Soft tissue Mobilization and stretch were applied to the: L shoulder  for 25 minutes, including:  PROM and stretch shoulder flexion,ER and abd   STM to R  pec, bicep,deltoid and UT     Ice applied on R shoulder in supine for 10 mins    PATIENT EDUCATION AND HOME EXERCISES     Home Exercises Provided and Patient Education Provided     Education provided:   - Educated pt on the importance of daily stretch to increase the benefit of therapy and positive results.      Written Home Exercises Provided: Patient instructed to cont prior HEP. Exercises were reviewed and Julia was able to demonstrate them prior to the end of the session.  Julia demonstrated good  understanding of the education provided. See EMR under Patient Instructions for exercises provided during therapy sessions    ASSESSMENT   Pt not able to perform goals reps with self AAROM shoulder flexion today due to muscular pain with shoulder. Pt understands to no overly push motion with HEP stretch. Pt received ice to shoulder today due to being sore and in pain today. Suggested pt to ice again tonight. Cont to progress per pt tolerance.     Julia Is progressing well towards her goals.   Pt prognosis is Good.     Pt will continue to benefit from skilled outpatient physical therapy to address the deficits listed in the problem list box on initial evaluation, provide pt/family education and to maximize pt's level of independence in the home and community environment.     Pt's spiritual, cultural and educational needs considered and pt agreeable to plan of care and goals.     Anticipated barriers to physical therapy: none    Goals:  Short Term Goals (<4 Weeks):   1. Pt to demonstrate compliance with initial HEP to improve therapeutic potential.  2. Pt to improve gross shoulder  motion by 25% to allow for improved functional mobility.      Long Term Goals (<8 Weeks):   1. Pt to achieve <40% disability as measured by the FOTO to demonstrate decreased disability with functional activities.   2. Pt to increase strength to at least 4/5 of muscles tested to allow for improvement in functional activities.  3. Pt to improve gross shoulder motion to <10% limitations compared to contralateral shoulder to allow for improved functional mobility with performing ADL's   4. Pt to report compliance with HEP 3x/week and demonstrate proper exercise technique to PT to show independence with self management of condition.  5. Improve DGI by at least 6 points to work towards lowering fall risk.    PLAN     Plan of care Certification: 1/17/2024 to 03/17/2024.     Outpatient Physical Therapy 1-2 times weekly for 8 weeks to include the following interventions: Electrical Stimulation prn, Manual Therapy, Moist Heat/ Ice, Neuromuscular Re-ed, Patient Education, Self Care, Therapeutic Activities, Therapeutic Exercise, and Dry Needling .        Concepcion Meyer, OZ     Physician: Jalen Villanueva MD    Visit Date: 2/2/2024    Physician: Jalen Villanueva MD     Physician Orders: PT Eval and Treat   Medical Diagnosis from Referral: Fracture of humeral head, closed, right, initial encounter   Evaluation Date: 1/17/2024  Authorization Period Expiration: 12/27/2024  Plan of Care Expiration: 03/17/2023  Progress Note Due: 02/17/2023  Visit # / Visits authorized: 1/20  FOTO: 1/3     Precautions: Standard   MD orders:  I think at this point we will start her on physical therapy requesting passive range of motion for the 1st week and then to begin active and active assisted range of motion following that.  I will see her back in 1 month     Time In: 10:00 am  Time Out: 10:57 am  Total Appointment Time (timed & untimed codes): 57 minutes     SUBJECTIVE     Pt reports:she has been having some pec soreness over the last couple  days.   She was compliant with home exercise program.  Response to previous treatment: No change   Functional change: None    Pain: 0/10  Location: right shoulder       OBJECTIVE     Objective Measures updated at progress report unless specified.       TREATMENT     Julia received the treatments listed below:        therapeutic activities to improve functional performance for 00  minutes, including:      received therapeutic exercises to develop strength and ROM for 00  minutes including:  Next visit: falls and shoulder rom/strength      Julia received therapeutic exercises to develop ROM, flexibility, and posture for 15 minutes including:     Active assisted mobility using cane: shoulder flexion/ER 3x20  sleeper stretch 3x30s     neuromuscular re-education activities to improve: Coordination, Kinesthetic, Sense, Proprioception, and Posture for 15 minutes. The following activities were included:  Scapular retractions 20x5s  Sidelying ER 1# 3x20      received the following manual therapy techniques: Soft tissue Mobilization and stretch were applied to the: L shoulder  for 25 minutes, including:  PROM and stretch shoulder flexion,ER and abd   STM to R  pec, bicep,deltoid and UT     PATIENT EDUCATION AND HOME EXERCISES     Home Exercises Provided and Patient Education Provided     Education provided:   - Educated pt on the importance of daily stretch to increase the benefit of therapy and positive results.      Written Home Exercises Provided: Patient instructed to cont prior HEP. Exercises were reviewed and Julia was able to demonstrate them prior to the end of the session.  Julia demonstrated good  understanding of the education provided. See EMR under Patient Instructions for exercises provided during therapy sessions    ASSESSMENT   Pec soreness significantly reduced with manual therapy. Shoulder rom progress-educated patient not to over stretch shoulder. Cont to progress per pt tolerance.     Julia Is progressing well  towards her goals.   Pt prognosis is Good.     Pt will continue to benefit from skilled outpatient physical therapy to address the deficits listed in the problem list box on initial evaluation, provide pt/family education and to maximize pt's level of independence in the home and community environment.     Pt's spiritual, cultural and educational needs considered and pt agreeable to plan of care and goals.     Anticipated barriers to physical therapy: none    Goals:  Short Term Goals (<4 Weeks):   1. Pt to demonstrate compliance with initial HEP to improve therapeutic potential.  2. Pt to improve gross shoulder motion by 25% to allow for improved functional mobility.      Long Term Goals (<8 Weeks):   1. Pt to achieve <40% disability as measured by the FOTO to demonstrate decreased disability with functional activities.   2. Pt to increase strength to at least 4/5 of muscles tested to allow for improvement in functional activities.  3. Pt to improve gross shoulder motion to <10% limitations compared to contralateral shoulder to allow for improved functional mobility with performing ADL's   4. Pt to report compliance with HEP 3x/week and demonstrate proper exercise technique to PT to show independence with self management of condition.  5. Improve DGI by at least 6 points to work towards lowering fall risk.    PLAN     Plan of care Certification: 1/17/2024 to 03/17/2024.     Outpatient Physical Therapy 1-2 times weekly for 8 weeks to include the following interventions: Electrical Stimulation prn, Manual Therapy, Moist Heat/ Ice, Neuromuscular Re-ed, Patient Education, Self Care, Therapeutic Activities, Therapeutic Exercise, and Dry Needling .        Concepcion Meyer, PTA

## 2024-02-07 ENCOUNTER — CLINICAL SUPPORT (OUTPATIENT)
Dept: REHABILITATION | Facility: HOSPITAL | Age: 79
End: 2024-02-07
Payer: MEDICARE

## 2024-02-07 DIAGNOSIS — Z91.81 RISK FOR FALLS: ICD-10-CM

## 2024-02-07 DIAGNOSIS — M25.611 DECREASED ROM OF RIGHT SHOULDER: Primary | ICD-10-CM

## 2024-02-07 PROCEDURE — 97112 NEUROMUSCULAR REEDUCATION: CPT | Mod: PO

## 2024-02-07 PROCEDURE — 97110 THERAPEUTIC EXERCISES: CPT | Mod: PO

## 2024-02-07 NOTE — PROGRESS NOTES
[facility]   Physical Therapy Treatment Note       [id number]    Therapy Diagnosis:   Encounter Diagnoses   Name Primary?    Decreased ROM of right shoulder Yes    Risk for falls      Physician: Jalen Villanueva MD    Visit Date: 2/7/2024    Physician: Jalen Villanueva MD     Physician Orders: PT Eval and Treat   Medical Diagnosis from Referral: Fracture of humeral head, closed, right, initial encounter   Evaluation Date: 1/17/2024  Authorization Period Expiration: 12/27/2024  Plan of Care Expiration: 03/17/2023  Progress Note Due: 02/17/2023  Visit # / Visits authorized: 3/20  FOTO: 1/3     Precautions: Standard   MD orders:  I think at this point we will start her on physical therapy requesting passive range of motion for the 1st week and then to begin active and active assisted range of motion following that.  I will see her back in 1 month     Time In: 1000 am  Time Out: 1055 am  Total Appointment Time (timed & untimed codes): 55 minutes     SUBJECTIVE     Pt reports:she has been having some pec soreness over the last couple days.   She was compliant with home exercise program.  Response to previous treatment: No change   Functional change: None    Pain: 0/10  Location: right shoulder       OBJECTIVE     Objective Measures updated at progress report unless specified.       TREATMENT     Julia received the treatments listed below:        therapeutic activities to improve functional performance for 00  minutes, including:      received therapeutic exercises to develop strength and ROM for 00  minutes including:  Next visit: falls and shoulder rom/strength      Julia received therapeutic exercises to develop ROM, flexibility, and posture for 15 minutes including:     Active assisted mobility using cane: shoulder flexion/ER 3x20  Pully: IR, flx 2x15    neuromuscular re-education activities to improve: Coordination, Kinesthetic, Sense, Proprioception,  and Posture for 20 minutes. The following activities were included:  Red tb: rows, A, ER, IR 2x6    received the following manual therapy techniques: Soft tissue Mobilization and stretch were applied to the: L shoulder  for 20 minutes, including:  PROM and stretch shoulder flexion,ER and abd   STM to R  pec, bicep,deltoid and UT -NP    PATIENT EDUCATION AND HOME EXERCISES     Home Exercises Provided and Patient Education Provided     Education provided:   - Educated pt on the importance of daily stretch to increase the benefit of therapy and positive results.      Written Home Exercises Provided: Patient instructed to cont prior HEP. Exercises were reviewed and Julia was able to demonstrate them prior to the end of the session.  Julia demonstrated good  understanding of the education provided. See EMR under Patient Instructions for exercises provided during therapy sessions    ASSESSMENT   Pt progressed to banded exercises to improve scapular and postural strength. Pt able to perform strengthening activities without reports of pain but repots pain with end range flexion and ER. Cueing needed for scapular control and posture throughout activities.     Julia Is progressing well towards her goals.   Pt prognosis is Good.     Pt will continue to benefit from skilled outpatient physical therapy to address the deficits listed in the problem list box on initial evaluation, provide pt/family education and to maximize pt's level of independence in the home and community environment.     Pt's spiritual, cultural and educational needs considered and pt agreeable to plan of care and goals.     Anticipated barriers to physical therapy: none    Goals:  Short Term Goals (<4 Weeks):   1. Pt to demonstrate compliance with initial HEP to improve therapeutic potential.  2. Pt to improve gross shoulder motion by 25% to allow for improved functional mobility.      Long Term Goals (<8 Weeks):   1. Pt to achieve <40% disability as measured  by the FOTO to demonstrate decreased disability with functional activities.   2. Pt to increase strength to at least 4/5 of muscles tested to allow for improvement in functional activities.  3. Pt to improve gross shoulder motion to <10% limitations compared to contralateral shoulder to allow for improved functional mobility with performing ADL's   4. Pt to report compliance with HEP 3x/week and demonstrate proper exercise technique to PT to show independence with self management of condition.  5. Improve DGI by at least 6 points to work towards lowering fall risk.    PLAN     Plan of care Certification: 1/17/2024 to 03/17/2024.     Outpatient Physical Therapy 1-2 times weekly for 8 weeks to include the following interventions: Electrical Stimulation prn, Manual Therapy, Moist Heat/ Ice, Neuromuscular Re-ed, Patient Education, Self Care, Therapeutic Activities, Therapeutic Exercise, and Dry Needling .        Maurizio Mitchell, PT, DPT

## 2024-02-09 ENCOUNTER — CLINICAL SUPPORT (OUTPATIENT)
Dept: REHABILITATION | Facility: HOSPITAL | Age: 79
End: 2024-02-09
Payer: MEDICARE

## 2024-02-09 DIAGNOSIS — Z91.81 RISK FOR FALLS: ICD-10-CM

## 2024-02-09 DIAGNOSIS — M25.611 DECREASED ROM OF RIGHT SHOULDER: Primary | ICD-10-CM

## 2024-02-09 PROCEDURE — 97112 NEUROMUSCULAR REEDUCATION: CPT | Mod: PO

## 2024-02-09 NOTE — PROGRESS NOTES
[facility]   Physical Therapy Treatment Note       Name: Julia PRAJAPATI [facility] Number: [id number]    Therapy Diagnosis:   Encounter Diagnoses   Name Primary?    Decreased ROM of right shoulder Yes    Risk for falls      Physician: Jalen Villanueva MD    Visit Date: 2/9/2024    Physician: Jalen Villanueva MD     Physician Orders: PT Eval and Treat   Medical Diagnosis from Referral: Fracture of humeral head, closed, right, initial encounter   Evaluation Date: 1/17/2024  Authorization Period Expiration: 12/27/2024  Plan of Care Expiration: 03/17/2023  Progress Note Due: 02/17/2023  Visit # / Visits authorized: 3/20  FOTO: 1/3     Precautions: Standard   MD orders:  I think at this point we will start her on physical therapy requesting passive range of motion for the 1st week and then to begin active and active assisted range of motion following that.  I will see her back in 1 month     Time In: 0951 am  Time Out: 1045 am  Total Appointment Time (timed & untimed codes): 54 minutes (25 min billed 1:1PT)     SUBJECTIVE     Pt reports:she has been having some pec soreness over the last couple days.   She was compliant with home exercise program.  Response to previous treatment: No change   Functional change: None    Pain: 0/10  Location: right shoulder       OBJECTIVE     Objective Measures updated at progress report unless specified.       TREATMENT     Julia received the treatments listed below:        therapeutic activities to improve functional performance for 00  minutes, including:    Julia received therapeutic exercises to develop ROM, flexibility, and posture for 14 minutes including:     Active assisted mobility using cane: shoulder flexion/ER 3x20  Pully: IR, flx 2x15    neuromuscular re-education activities to improve: Coordination, Kinesthetic, Sense, Proprioception, and Posture for 25 minutes. The following activities were included:  Diaphragmatic breathing 5' with scap squeeze  into towel  Red tb: rows, A, ER, IR 2x10    received the following manual therapy techniques: Soft tissue Mobilization and stretch were applied to the: L shoulder  for 15 minutes, including:  PROM and stretch shoulder flexion,ER and abd   STM to R  pec, bicep,deltoid and UT     PATIENT EDUCATION AND HOME EXERCISES     Home Exercises Provided and Patient Education Provided     Education provided:   - Educated pt on the importance of daily stretch to increase the benefit of therapy and positive results.      Written Home Exercises Provided: Patient instructed to cont prior HEP. Exercises were reviewed and Julia was able to demonstrate them prior to the end of the session.  Julia demonstrated good  understanding of the education provided. See EMR under Patient Instructions for exercises provided during therapy sessions    ASSESSMENT   No pain noted today but patient requires moderate cueing for scapular control and posture throughout activities. Palpable tenderness noted in right UT/pec that reduces with manual therapy. Continue to address postural deficits at subsequent visits.     Julia Is progressing well towards her goals.   Pt prognosis is Good.     Pt will continue to benefit from skilled outpatient physical therapy to address the deficits listed in the problem list box on initial evaluation, provide pt/family education and to maximize pt's level of independence in the home and community environment.     Pt's spiritual, cultural and educational needs considered and pt agreeable to plan of care and goals.     Anticipated barriers to physical therapy: none    Goals:  Short Term Goals (<4 Weeks):   1. Pt to demonstrate compliance with initial HEP to improve therapeutic potential.  2. Pt to improve gross shoulder motion by 25% to allow for improved functional mobility.      Long Term Goals (<8 Weeks):   1. Pt to achieve <40% disability as measured by the FOTO to demonstrate decreased disability with functional  activities.   2. Pt to increase strength to at least 4/5 of muscles tested to allow for improvement in functional activities.  3. Pt to improve gross shoulder motion to <10% limitations compared to contralateral shoulder to allow for improved functional mobility with performing ADL's   4. Pt to report compliance with HEP 3x/week and demonstrate proper exercise technique to PT to show independence with self management of condition.  5. Improve DGI by at least 6 points to work towards lowering fall risk.    PLAN     Plan of care Certification: 1/17/2024 to 03/17/2024.     Outpatient Physical Therapy 1-2 times weekly for 8 weeks to include the following interventions: Electrical Stimulation prn, Manual Therapy, Moist Heat/ Ice, Neuromuscular Re-ed, Patient Education, Self Care, Therapeutic Activities, Therapeutic Exercise, and Dry Needling .        Maurizio Mitchell, PT, DPT      abnormal fetal heart rate tracing/maternal fever

## 2024-02-14 ENCOUNTER — CLINICAL SUPPORT (OUTPATIENT)
Dept: REHABILITATION | Facility: HOSPITAL | Age: 79
End: 2024-02-14
Payer: MEDICARE

## 2024-02-14 DIAGNOSIS — M25.611 DECREASED ROM OF RIGHT SHOULDER: Primary | ICD-10-CM

## 2024-02-14 DIAGNOSIS — Z91.81 RISK FOR FALLS: ICD-10-CM

## 2024-02-14 PROCEDURE — 97112 NEUROMUSCULAR REEDUCATION: CPT | Mod: PO,CQ

## 2024-02-14 NOTE — PROGRESS NOTES
[facility]   Physical Therapy Treatment Note       Name: Julia PRAJAPATI [facility] Number: [id number]    Therapy Diagnosis:   Encounter Diagnoses   Name Primary?    Decreased ROM of right shoulder Yes    Risk for falls        Physician: Jalen Villanueva MD    Visit Date: 2/14/2024    Physician: Jalen Villanueva MD     Physician Orders: PT Eval and Treat   Medical Diagnosis from Referral: Fracture of humeral head, closed, right, initial encounter   Evaluation Date: 1/17/2024  Authorization Period Expiration: 12/27/2024  Plan of Care Expiration: 03/17/2023  Progress Note Due: 02/17/2023  Visit # / Visits authorized: 3/20  FOTO: 1/3     Precautions: Standard   MD orders:  I think at this point we will start her on physical therapy requesting passive range of motion for the 1st week and then to begin active and active assisted range of motion following that.  I will see her back in 1 month     Time In: 11:00 am  Time Out: 11:45 am  Total Appointment Time (timed & untimed codes): 30 minutes (1:1PTA)     SUBJECTIVE     Pt reports:she he doctor stated he was happy with her progress. No pain today.  She was compliant with home exercise program.  Response to previous treatment: No change   Functional change: None    Pain: 0/10  Location: right shoulder       OBJECTIVE     Objective Measures updated at progress report unless specified.       TREATMENT     Julia received the treatments listed below:        therapeutic activities to improve functional performance for 00  minutes, including:    Julia received therapeutic exercises to develop ROM, flexibility, and posture for 15 minutes including:     Active assisted mobility using cane: shoulder flexion/ER 3x20  Pully: IR, flx 2x15    neuromuscular re-education activities to improve: Coordination, Kinesthetic, Sense, Proprioception, and Posture for 25 minutes. The following activities were included:  Diaphragmatic breathing 5' with scap squeeze into  amarilis  Red tb: rows, A, ER, IR 2x10    received the following manual therapy techniques: Soft tissue Mobilization and stretch were applied to the: L shoulder  for 15 minutes, including:  PROM and stretch shoulder flexion,ER and abd   STM to R  pec, bicep,deltoid and UT     PATIENT EDUCATION AND HOME EXERCISES     Home Exercises Provided and Patient Education Provided     Education provided:   - Educated pt on the importance of daily stretch to increase the benefit of therapy and positive results.      Written Home Exercises Provided: Patient instructed to cont prior HEP. Exercises were reviewed and Julia was able to demonstrate them prior to the end of the session.  Julia demonstrated good  understanding of the education provided. See EMR under Patient Instructions for exercises provided during therapy sessions    ASSESSMENT   No pain noted today but patient requires moderate cueing for correct  scapular control and posture awareness throughout activities.Little more active range into flexion post session. Palpable tenderness noted in right UT/pec that reduces with manual therapy. Continue to address postural deficits at subsequent visits.     Julia Is progressing well towards her goals.   Pt prognosis is Good.     Pt will continue to benefit from skilled outpatient physical therapy to address the deficits listed in the problem list box on initial evaluation, provide pt/family education and to maximize pt's level of independence in the home and community environment.     Pt's spiritual, cultural and educational needs considered and pt agreeable to plan of care and goals.     Anticipated barriers to physical therapy: none    Goals:  Short Term Goals (<4 Weeks):   1. Pt to demonstrate compliance with initial HEP to improve therapeutic potential.  2. Pt to improve gross shoulder motion by 25% to allow for improved functional mobility.      Long Term Goals (<8 Weeks):   1. Pt to achieve <40% disability as measured by the  FOTO to demonstrate decreased disability with functional activities.   2. Pt to increase strength to at least 4/5 of muscles tested to allow for improvement in functional activities.  3. Pt to improve gross shoulder motion to <10% limitations compared to contralateral shoulder to allow for improved functional mobility with performing ADL's   4. Pt to report compliance with HEP 3x/week and demonstrate proper exercise technique to PT to show independence with self management of condition.  5. Improve DGI by at least 6 points to work towards lowering fall risk.    PLAN     Plan of care Certification: 1/17/2024 to 03/17/2024.     Outpatient Physical Therapy 1-2 times weekly for 8 weeks to include the following interventions: Electrical Stimulation prn, Manual Therapy, Moist Heat/ Ice, Neuromuscular Re-ed, Patient Education, Self Care, Therapeutic Activities, Therapeutic Exercise, and Dry Needling .        Concepcion Meyer, PTA

## 2024-02-16 ENCOUNTER — CLINICAL SUPPORT (OUTPATIENT)
Dept: REHABILITATION | Facility: HOSPITAL | Age: 79
End: 2024-02-16
Payer: MEDICARE

## 2024-02-16 DIAGNOSIS — M25.611 DECREASED ROM OF RIGHT SHOULDER: Primary | ICD-10-CM

## 2024-02-16 PROCEDURE — 97110 THERAPEUTIC EXERCISES: CPT | Mod: PO,CQ

## 2024-02-16 NOTE — PROGRESS NOTES
[facility]   Physical Therapy Treatment Note       Name: Julia PRAJAPATI [facility] Number: [id number]    Therapy Diagnosis:   Encounter Diagnosis   Name Primary?    Decreased ROM of right shoulder Yes         Physician: Jalen Villanueva MD    Visit Date: 2/16/2024    Physician: Jalen Villanueva MD     Physician Orders: PT Eval and Treat   Medical Diagnosis from Referral: Fracture of humeral head, closed, right, initial encounter   Evaluation Date: 1/17/2024  Authorization Period Expiration: 12/27/2024  Plan of Care Expiration: 03/17/2023  Progress Note Due: 02/17/2023  Visit # / Visits authorized: 4/20  FOTO: 1/3     Precautions: Standard   MD orders:  I think at this point we will start her on physical therapy requesting passive range of motion for the 1st week and then to begin active and active assisted range of motion following that.  I will see her back in 1 month     Time In: 11:00 am  Time Out: 11:45 am  Total Appointment Time (timed & untimed codes): 30 minutes      SUBJECTIVE     Pt reports:she he doctor stated he was happy with her progress. No pain today.  She was compliant with home exercise program.  Response to previous treatment: No change   Functional change: None    Pain: 0/10  Location: right shoulder       OBJECTIVE     Objective Measures updated at progress report unless specified.       TREATMENT     Julia received the treatments listed below:        therapeutic activities to improve functional performance for 00  minutes, including:    Julia received therapeutic exercises to develop ROM, flexibility, and posture for 15 minutes including:     Active assisted mobility using cane: shoulder flexion/ER 3x20  Pully: IR, flx 2x15    neuromuscular re-education activities to improve: Coordination, Kinesthetic, Sense, Proprioception, and Posture for 25 minutes. The following activities were included:  Diaphragmatic breathing 5' with scap squeeze into towel  Red tb: rows, A,  ER, IR 2x10    received the following manual therapy techniques: Soft tissue Mobilization and stretch were applied to the: L shoulder  for 15 minutes, including:  PROM and stretch shoulder flexion,ER and abd   STM to R  pec, bicep,deltoid and UT     PATIENT EDUCATION AND HOME EXERCISES     Home Exercises Provided and Patient Education Provided     Education provided:   - Educated pt on the importance of daily stretch to increase the benefit of therapy and positive results.      Written Home Exercises Provided: Patient instructed to cont prior HEP. Exercises were reviewed and Julia was able to demonstrate them prior to the end of the session.  Julia demonstrated good  understanding of the education provided. See EMR under Patient Instructions for exercises provided during therapy sessions    ASSESSMENT   No pain noted today with TE, only with end range stretch.  Little more active range into flexion post session.Pt conts to need vc on correct scapular engagement with TE. Continue to address postural deficits at subsequent visits.     Julia Is progressing well towards her goals.   Pt prognosis is Good.     Pt will continue to benefit from skilled outpatient physical therapy to address the deficits listed in the problem list box on initial evaluation, provide pt/family education and to maximize pt's level of independence in the home and community environment.     Pt's spiritual, cultural and educational needs considered and pt agreeable to plan of care and goals.     Anticipated barriers to physical therapy: none    Goals:  Short Term Goals (<4 Weeks):   1. Pt to demonstrate compliance with initial HEP to improve therapeutic potential.  2. Pt to improve gross shoulder motion by 25% to allow for improved functional mobility.      Long Term Goals (<8 Weeks):   1. Pt to achieve <40% disability as measured by the FOTO to demonstrate decreased disability with functional activities.   2. Pt to increase strength to at least 4/5  of muscles tested to allow for improvement in functional activities.  3. Pt to improve gross shoulder motion to <10% limitations compared to contralateral shoulder to allow for improved functional mobility with performing ADL's   4. Pt to report compliance with HEP 3x/week and demonstrate proper exercise technique to PT to show independence with self management of condition.  5. Improve DGI by at least 6 points to work towards lowering fall risk.    PLAN     Plan of care Certification: 1/17/2024 to 03/17/2024.     Outpatient Physical Therapy 1-2 times weekly for 8 weeks to include the following interventions: Electrical Stimulation prn, Manual Therapy, Moist Heat/ Ice, Neuromuscular Re-ed, Patient Education, Self Care, Therapeutic Activities, Therapeutic Exercise, and Dry Needling .        Concepcion Meyer, PTA

## 2024-02-21 ENCOUNTER — CLINICAL SUPPORT (OUTPATIENT)
Dept: REHABILITATION | Facility: HOSPITAL | Age: 79
End: 2024-02-21
Payer: MEDICARE

## 2024-02-21 DIAGNOSIS — Z91.81 RISK FOR FALLS: ICD-10-CM

## 2024-02-21 DIAGNOSIS — M25.611 DECREASED ROM OF RIGHT SHOULDER: Primary | ICD-10-CM

## 2024-02-21 PROCEDURE — 97112 NEUROMUSCULAR REEDUCATION: CPT | Mod: PO

## 2024-02-21 NOTE — PROGRESS NOTES
[facility]   Physical Therapy Treatment Note       Name: Julia PRAJAPATI [facility] Number: [id number]    Therapy Diagnosis:   Encounter Diagnoses   Name Primary?    Decreased ROM of right shoulder Yes    Risk for falls            Physician: Jalen Villanueva MD    Visit Date: 2/21/2024    Physician: Jalen Villanueva MD     Physician Orders: PT Eval and Treat   Medical Diagnosis from Referral: Fracture of humeral head, closed, right, initial encounter   Evaluation Date: 1/17/2024  Authorization Period Expiration: 12/27/2024  Plan of Care Expiration: 03/17/2023  Progress Note Due: 02/17/2023  Visit # / Visits authorized: 4/20  FOTO: 1/3     Precautions: Standard   MD orders:  I think at this point we will start her on physical therapy requesting passive range of motion for the 1st week and then to begin active and active assisted range of motion following that.  I will see her back in 1 month     Time In: 1:00 am  Time Out: 1:50 am  Total Appointment Time (timed & untimed codes): 50 minutes (30 min billed)     SUBJECTIVE     Pt reports:she  gets most of her pain at the end of the day when trying to reach in the cabinet   She was compliant with home exercise program.  Response to previous treatment: No change   Functional change: None    Pain: 0/10  Location: right shoulder       OBJECTIVE     Objective Measures updated at progress report unless specified.       TREATMENT     Julia received the treatments listed below:        therapeutic activities to improve functional performance for 00  minutes, including:    Julia received therapeutic exercises to develop ROM, flexibility, and posture for 20 minutes including:     Active assisted mobility using cane: shoulder flexion/ER 3x20  Pully: IR, flx 2x15    neuromuscular re-education activities to improve: Coordination, Kinesthetic, Sense, Proprioception, and Posture for 30 minutes. The following activities were included:  Diaphragmatic breathing  5' with scap squeeze into towel  Red tb: rows, A, ER, IR 3x10  Banded flexion red 3x10  Banded horizontal abduction red 2x10    received the following manual therapy techniques: Soft tissue Mobilization and stretch were applied to the: L shoulder  for 00 minutes, including:  PROM and stretch shoulder flexion,ER and abd   STM to R  pec, bicep,deltoid and UT     PATIENT EDUCATION AND HOME EXERCISES     Home Exercises Provided and Patient Education Provided     Education provided:   - Educated pt on the importance of daily stretch to increase the benefit of therapy and positive results.      Written Home Exercises Provided: Patient instructed to cont prior HEP. Exercises were reviewed and Julia was able to demonstrate them prior to the end of the session.  Julia demonstrated good  understanding of the education provided. See EMR under Patient Instructions for exercises provided during therapy sessions    ASSESSMENT   Pt reports pain with OH activities in the front of her shoulder. Pt able to achieve more active flexion range as session progresses. Cueing required for scapular retraction and upright posture throughout session.   Mable Han used for one on one treatment    Julia Is progressing well towards her goals.   Pt prognosis is Good.     Pt will continue to benefit from skilled outpatient physical therapy to address the deficits listed in the problem list box on initial evaluation, provide pt/family education and to maximize pt's level of independence in the home and community environment.     Pt's spiritual, cultural and educational needs considered and pt agreeable to plan of care and goals.     Anticipated barriers to physical therapy: none    Goals:  Short Term Goals (<4 Weeks):   1. Pt to demonstrate compliance with initial HEP to improve therapeutic potential.  2. Pt to improve gross shoulder motion by 25% to allow for improved functional mobility.      Long Term Goals (<8 Weeks):   1. Pt to achieve <40%  disability as measured by the FOTO to demonstrate decreased disability with functional activities.   2. Pt to increase strength to at least 4/5 of muscles tested to allow for improvement in functional activities.  3. Pt to improve gross shoulder motion to <10% limitations compared to contralateral shoulder to allow for improved functional mobility with performing ADL's   4. Pt to report compliance with HEP 3x/week and demonstrate proper exercise technique to PT to show independence with self management of condition.  5. Improve DGI by at least 6 points to work towards lowering fall risk.    PLAN     Plan of care Certification: 1/17/2024 to 03/17/2024.     Outpatient Physical Therapy 1-2 times weekly for 8 weeks to include the following interventions: Electrical Stimulation prn, Manual Therapy, Moist Heat/ Ice, Neuromuscular Re-ed, Patient Education, Self Care, Therapeutic Activities, Therapeutic Exercise, and Dry Needling .        Maurizio Mitchell, PT, DPT

## 2024-02-23 ENCOUNTER — CLINICAL SUPPORT (OUTPATIENT)
Dept: REHABILITATION | Facility: HOSPITAL | Age: 79
End: 2024-02-23
Payer: MEDICARE

## 2024-02-23 DIAGNOSIS — Z91.81 RISK FOR FALLS: ICD-10-CM

## 2024-02-23 DIAGNOSIS — M25.611 DECREASED ROM OF RIGHT SHOULDER: Primary | ICD-10-CM

## 2024-02-23 PROCEDURE — 97112 NEUROMUSCULAR REEDUCATION: CPT | Mod: PO,CQ

## 2024-02-23 NOTE — PROGRESS NOTES
[facility]   Physical Therapy Treatment Note       Name: Julia PRAJAPATI [facility] Number: [id number]    Therapy Diagnosis:   Encounter Diagnoses   Name Primary?    Decreased ROM of right shoulder Yes    Risk for falls              Physician: Jalen Villanueva MD    Visit Date: 2/23/2024    Physician: Jalen Villanueva MD     Physician Orders: PT Eval and Treat   Medical Diagnosis from Referral: Fracture of humeral head, closed, right, initial encounter   Evaluation Date: 1/17/2024  Authorization Period Expiration: 12/27/2024  Plan of Care Expiration: 03/17/2023  Progress Note Due: 02/17/2023  Visit # / Visits authorized: 4/20  FOTO: 1/3     Precautions: Standard   MD orders:  I think at this point we will start her on physical therapy requesting passive range of motion for the 1st week and then to begin active and active assisted range of motion following that.  I will see her back in 1 month     Time In: 1:00 pm  Time Out: 2:00 pm  Total Appointment Time (timed & untimed codes): 30 mins     SUBJECTIVE     Pt reports:she has no pain when moving shoulder. 3/10 when moving shoulder or using arm.  She was compliant with home exercise program.  Response to previous treatment: No change   Functional change: None    Pain: 0/10  Location: right shoulder       OBJECTIVE     Objective Measures updated at progress report unless specified.       TREATMENT     Julia received the treatments listed below:        therapeutic activities to improve functional performance for 00  minutes, including:    Julia received therapeutic exercises to develop ROM, flexibility, and posture for 20 minutes including:     Active assisted mobility using cane: shoulder flexion/ER 3x20  Pully: IR, flx 2x15    neuromuscular re-education activities to improve: Coordination, Kinesthetic, Sense, Proprioception, and Posture for 30 minutes. The following activities were included:  Diaphragmatic breathing 5' with scap squeeze  into towel  Red tb: rows, A, ER, IR 3x10  Banded flexion red 3x10  Banded horizontal abduction red 2x10    received the following manual therapy techniques: Soft tissue Mobilization and stretch were applied to the: L shoulder  for 10 minutes, including:  PROM and stretch shoulder flexion,ER and abd   STM to R  pec, bicep,deltoid and UT     PATIENT EDUCATION AND HOME EXERCISES     Home Exercises Provided and Patient Education Provided     Education provided:   - Educated pt on the importance of daily stretch to increase the benefit of therapy and positive results.      Written Home Exercises Provided: Patient instructed to cont prior HEP. Exercises were reviewed and Julia was able to demonstrate them prior to the end of the session.  Julia demonstrated good  understanding of the education provided. See EMR under Patient Instructions for exercises provided during therapy sessions    ASSESSMENT   Pt conts to be limited with shoulder IR and unable to fully place arm behind back.   Pt able to achieve more active flexion range as session progresses. Pt happy with her progress of motion at this time. Pt required cueing for correct postioning of scapular TE.  Mable Han used for one on one treatment    Julia Is progressing well towards her goals.   Pt prognosis is Good.     Pt will continue to benefit from skilled outpatient physical therapy to address the deficits listed in the problem list box on initial evaluation, provide pt/family education and to maximize pt's level of independence in the home and community environment.     Pt's spiritual, cultural and educational needs considered and pt agreeable to plan of care and goals.     Anticipated barriers to physical therapy: none    Goals:  Short Term Goals (<4 Weeks):   1. Pt to demonstrate compliance with initial HEP to improve therapeutic potential.  2. Pt to improve gross shoulder motion by 25% to allow for improved functional mobility.      Long Term Goals (<8 Weeks):    1. Pt to achieve <40% disability as measured by the FOTO to demonstrate decreased disability with functional activities.   2. Pt to increase strength to at least 4/5 of muscles tested to allow for improvement in functional activities.  3. Pt to improve gross shoulder motion to <10% limitations compared to contralateral shoulder to allow for improved functional mobility with performing ADL's   4. Pt to report compliance with HEP 3x/week and demonstrate proper exercise technique to PT to show independence with self management of condition.  5. Improve DGI by at least 6 points to work towards lowering fall risk.    PLAN     Plan of care Certification: 1/17/2024 to 03/17/2024.     Outpatient Physical Therapy 1-2 times weekly for 8 weeks to include the following interventions: Electrical Stimulation prn, Manual Therapy, Moist Heat/ Ice, Neuromuscular Re-ed, Patient Education, Self Care, Therapeutic Activities, Therapeutic Exercise, and Dry Needling .        Concepcion Meyer, PTA

## 2024-03-03 PROBLEM — N18.31 CHRONIC KIDNEY DISEASE, STAGE 3A: Status: ACTIVE | Noted: 2024-03-03

## 2024-03-03 PROBLEM — I27.20 MILD PULMONARY HYPERTENSION: Status: ACTIVE | Noted: 2024-03-03

## 2024-04-30 ENCOUNTER — OFFICE VISIT (OUTPATIENT)
Dept: OTOLARYNGOLOGY | Facility: CLINIC | Age: 79
End: 2024-04-30
Payer: MEDICARE

## 2024-04-30 VITALS
HEIGHT: 62 IN | SYSTOLIC BLOOD PRESSURE: 166 MMHG | DIASTOLIC BLOOD PRESSURE: 86 MMHG | WEIGHT: 183.88 LBS | BODY MASS INDEX: 33.84 KG/M2

## 2024-04-30 DIAGNOSIS — H81.13 BPPV (BENIGN PAROXYSMAL POSITIONAL VERTIGO), BILATERAL: Primary | ICD-10-CM

## 2024-04-30 DIAGNOSIS — H92.01 REFERRED OTALGIA, RIGHT: ICD-10-CM

## 2024-04-30 PROCEDURE — 3079F DIAST BP 80-89 MM HG: CPT | Mod: CPTII,S$GLB,, | Performed by: OTOLARYNGOLOGY

## 2024-04-30 PROCEDURE — 95992 CANALITH REPOSITIONING PROC: CPT | Mod: S$GLB,,, | Performed by: OTOLARYNGOLOGY

## 2024-04-30 PROCEDURE — 99213 OFFICE O/P EST LOW 20 MIN: CPT | Mod: 25,S$GLB,, | Performed by: OTOLARYNGOLOGY

## 2024-04-30 PROCEDURE — 99999 PR PBB SHADOW E&M-EST. PATIENT-LVL IV: CPT | Mod: PBBFAC,,, | Performed by: OTOLARYNGOLOGY

## 2024-04-30 PROCEDURE — 3077F SYST BP >= 140 MM HG: CPT | Mod: CPTII,S$GLB,, | Performed by: OTOLARYNGOLOGY

## 2024-04-30 PROCEDURE — 1100F PTFALLS ASSESS-DOCD GE2>/YR: CPT | Mod: CPTII,S$GLB,, | Performed by: OTOLARYNGOLOGY

## 2024-04-30 PROCEDURE — 1159F MED LIST DOCD IN RCRD: CPT | Mod: CPTII,S$GLB,, | Performed by: OTOLARYNGOLOGY

## 2024-04-30 PROCEDURE — 3288F FALL RISK ASSESSMENT DOCD: CPT | Mod: CPTII,S$GLB,, | Performed by: OTOLARYNGOLOGY

## 2024-04-30 PROCEDURE — 1126F AMNT PAIN NOTED NONE PRSNT: CPT | Mod: CPTII,S$GLB,, | Performed by: OTOLARYNGOLOGY

## 2024-04-30 PROCEDURE — 1160F RVW MEDS BY RX/DR IN RCRD: CPT | Mod: CPTII,S$GLB,, | Performed by: OTOLARYNGOLOGY

## 2024-04-30 NOTE — PATIENT INSTRUCTIONS
You have bilateral BPPV, which means the crystals are malpositioned in both inner ears.    WE FIXED the right side today.  No additional exercises today.  Recommend re-testing your right side tomorrow to see if improved.  This can be performed by doing a Bonita Hallpike. If this is NEGATIVE for the right side (meaning, no spinning when laying down or sitting up) then you can proceed with testing the LEFT side again, and if spinning occurs, you can then perform an exercise below   Epley maneuver  Call or message if any concerns and we will get you in.    Benign Paroxysmal Positional Vertigo  Benign paroxysmal positional vertigo (BPPV) is a problem with the inner ear. The inner ear contains the vestibular system (balance organ). BPPV causes a feeling of spinning. It is a common problem of the vestibular system.    Understanding BPPV  The vestibular system (balance organ) of the ear is made up of very tiny parts. They include the utricle, saccule, and semicircular canals. The utricle is a tiny organ that contains calcium crystals. In some people, the crystals can move into the semicircular canals. When this happens, the system no longer works as it should. This causes BPPV.     What causes BPPV?  Causes include injury to your head or neck, other balance disorders; but for most, the cause is unknown.    Symptoms of BPPV  You many have repeated feelings of spinning (vertigo). The vertigo usually lasts less than 1 minute. Some movements, suchas rolling over in bed, can bring on vertigo.    Treatment for BPPV  Your health care provider may try to move the calcium crystals. This is done by having you move your head and neck in certain ways. This treatment is safe and often works well. You may also be told to do these movements at home. You may still have vertigo for a few weeks. Your health care provider will recheck your symptoms, usually in a few weeks. Special physical therapy may also be part of treatment.  In rare cases  "surgery may be needed for BPPV that does not go away.    INSTRUCTIONS FOR PATIENTS AFTER OFFICE TREATMENTS     1. Wait until you are not feeling dizzy / nauseated before you go home.     2. In general, there are no restrictions following repositioning of the crystals. You will want to avoid rapid movements of your head or extreme position changes in your head for next day or two. Sleep on an extra pillow for the 1 night. You may want to avoid sleeping on the "bad" side for a few days, though this is not an absolute necessity.    3. Following a few days, you can resume normal activities. It is normal to have some dizziness for a few days - weeks following the procedure, but it should generally be improving / improved by that point.     If you continue to have vertigo, you may be instructed to perform some exercises at home. These can be easily found on YouTube. Be sure to perform the exercise for the SIDE OF THE AFFECTED EAR.     Exercises include:  Epley Maneuver  Semont Maneuver  Palacios-Daroff Maneuver    I recommend performing the exercises twice daily for up to 3 days. If you continue to have dizziness following this, notify the office so we can re-evaluate promptly.   "

## 2024-04-30 NOTE — PROGRESS NOTES
Subjective:       Patient ID: Julia Ward is a 79 y.o. female.    Chief Complaint: Otalgia and Dizziness    Julia is here for follow-up.   Here today for otalgia. Right sided, for many years. Similar to previous.  It is intermittent, inside the ear and retroauricular.  Denies jaw pain.     She also has been having dizziness when she sits up from the bed to use the restroom. Right sided. Has to sleep on left side as a result. Vertigo is brief.     Last OV  with me for the same issue, referred in nature.   Long history of neck and spine issues      Patient validated questionnaires (if applicable):      %            No data to display                   No data to display                   No data to display                     Review of Systems   Constitutional: Negative for activity change and appetite change.   Respiratory: Negative for difficulty breathing and wheezing   Cardiovascular: Negative for chest pain.      Objective:        Constitutional:   Vital signs are normal. She appears well-developed and well-nourished.     Head:  Normocephalic and atraumatic.     Ears:  Hearing normal to normal and whispered voice; external ear normal without scars, lesions, or masses; ear canal, tympanic membrane, and middle ear normal..     Nose:  Nose normal including turbinates, nasal mucosa, sinuses and nasal septum.     Mouth/Throat  Oropharynx clear and moist without lesions or asymmetry.     Neck:  Neck normal without thyromegaly masses, asymmetry, normal tracheal structure, crepitus, and tenderness.   Retromastoid pain on right. Mild TTP SCM         Tests / Results:  Patient: Julia Ward [id number], :1945  Procedure date:2024  Patient's medications, allergies, past medical, surgical, social and family histories were reviewed and updated as appropriate.  Chief Complaint:  Otalgia and Dizziness    HPI:  Julia is a 79 y.o. female with benign paroxysmal positional vertigo (BPPV) refractory to medical  therapy. Hallpike-Bonita maneuver demonstrates nystagmus of delayed onset that fatigues, rotary, clockwise, associated with vertigo.    Procedure: Risks, benefits, and alternatives of the procedure were discussed with the patient, and the patient consented to the Epley maneuver or canalith repositioning procedure (CRP).      With the patient sitting upright on the examination table, the head was lowered to the supine position and the neck rotated 45 degrees to the right side; once nystagmus fatigued, the body and head were slowly rotated to the opposite side 90 degrees, and then after 30-60 seconds the rotation continued another 90 degrees (they rolled onto their shoulder and were looking downwards at a 45 degree angle). After the nystagmus resolved, the patient was gently allowed to sit up with neck flexion.    There were no complications and the patient tolerated it well.  Post-procedure instructions were given.    Shahbaz Toledo performed the entire procedure.      Assessment:       1. BPPV (benign paroxysmal positional vertigo), bilateral    2. Referred otalgia, right          Plan:         Bilateral BPPV  Right Epley today  Discussed exercises and re-testing (ok to do at home if desired vs. Here vs. PT.)  Notify if persistent concerns    Otalgia is referred and not related to ear issue. Longstanding

## 2024-06-10 PROBLEM — M25.511 RIGHT SHOULDER PAIN: Status: ACTIVE | Noted: 2024-06-10

## 2024-06-10 PROBLEM — M25.611 DECREASED RANGE OF MOTION OF RIGHT SHOULDER: Status: ACTIVE | Noted: 2024-06-10

## 2024-06-10 PROBLEM — R29.898 WEAKNESS OF RIGHT UPPER EXTREMITY: Status: ACTIVE | Noted: 2024-06-10

## 2024-08-19 RX ORDER — KETOCONAZOLE 20 MG/ML
SHAMPOO, SUSPENSION TOPICAL
Qty: 120 ML | Refills: 5 | Status: SHIPPED | OUTPATIENT
Start: 2024-08-19

## 2025-04-28 ENCOUNTER — TELEPHONE (OUTPATIENT)
Dept: PRIMARY CARE CLINIC | Facility: CLINIC | Age: 80
End: 2025-04-28
Payer: MEDICARE

## 2025-09-05 ENCOUNTER — OFFICE VISIT (OUTPATIENT)
Dept: PRIMARY CARE CLINIC | Facility: CLINIC | Age: 80
End: 2025-09-05
Payer: MEDICARE

## 2025-09-05 VITALS
WEIGHT: 186.63 LBS | DIASTOLIC BLOOD PRESSURE: 72 MMHG | TEMPERATURE: 98 F | BODY MASS INDEX: 34.34 KG/M2 | HEIGHT: 62 IN | OXYGEN SATURATION: 97 % | SYSTOLIC BLOOD PRESSURE: 138 MMHG | HEART RATE: 81 BPM

## 2025-09-05 DIAGNOSIS — N18.31 CHRONIC KIDNEY DISEASE, STAGE 3A: ICD-10-CM

## 2025-09-05 DIAGNOSIS — I10 ESSENTIAL HYPERTENSION: ICD-10-CM

## 2025-09-05 DIAGNOSIS — F41.1 GENERALIZED ANXIETY DISORDER: ICD-10-CM

## 2025-09-05 DIAGNOSIS — Z76.89 ENCOUNTER TO ESTABLISH CARE: Primary | ICD-10-CM

## 2025-09-05 DIAGNOSIS — Z13.6 ENCOUNTER FOR SCREENING FOR CARDIOVASCULAR DISORDERS: ICD-10-CM

## 2025-09-05 DIAGNOSIS — M16.12 PRIMARY OSTEOARTHRITIS OF LEFT HIP: ICD-10-CM

## 2025-09-05 DIAGNOSIS — I27.20 MILD PULMONARY HYPERTENSION: ICD-10-CM

## 2025-09-05 DIAGNOSIS — K21.9 GASTROESOPHAGEAL REFLUX DISEASE WITHOUT ESOPHAGITIS: ICD-10-CM

## 2025-09-05 PROCEDURE — 99999 PR PBB SHADOW E&M-EST. PATIENT-LVL III: CPT | Mod: PBBFAC,,, | Performed by: INTERNAL MEDICINE

## 2025-09-05 RX ORDER — AMLODIPINE BESYLATE 5 MG/1
5 TABLET ORAL DAILY
Qty: 90 TABLET | Refills: 3 | Status: SHIPPED | OUTPATIENT
Start: 2025-09-05